# Patient Record
Sex: MALE | Race: WHITE | Employment: FULL TIME | URBAN - METROPOLITAN AREA
[De-identification: names, ages, dates, MRNs, and addresses within clinical notes are randomized per-mention and may not be internally consistent; named-entity substitution may affect disease eponyms.]

---

## 2019-02-13 ENCOUNTER — APPOINTMENT (OUTPATIENT)
Dept: GENERAL RADIOLOGY | Age: 64
End: 2019-02-13
Payer: COMMERCIAL

## 2019-02-13 ENCOUNTER — HOSPITAL ENCOUNTER (EMERGENCY)
Age: 64
Discharge: HOME | End: 2019-02-13
Payer: COMMERCIAL

## 2019-02-13 VITALS
BODY MASS INDEX: 27.59 KG/M2 | TEMPERATURE: 97.6 F | DIASTOLIC BLOOD PRESSURE: 70 MMHG | OXYGEN SATURATION: 97 % | SYSTOLIC BLOOD PRESSURE: 133 MMHG | RESPIRATION RATE: 16 BRPM | HEART RATE: 80 BPM | WEIGHT: 176.15 LBS

## 2019-02-13 DIAGNOSIS — S39.012A STRAIN OF LUMBAR REGION, INITIAL ENCOUNTER: Primary | ICD-10-CM
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
## 2019-02-13 PROCEDURE — 6360000002 HC RX W HCPCS: Performed by: PHYSICIAN ASSISTANT

## 2019-02-13 PROCEDURE — 99283 EMERGENCY DEPT VISIT LOW MDM: CPT

## 2019-02-13 PROCEDURE — 96372 THER/PROPH/DIAG INJ SC/IM: CPT

## 2019-02-13 PROCEDURE — 72100 X-RAY EXAM L-S SPINE 2/3 VWS: CPT

## 2019-02-13 PROCEDURE — 73502 X-RAY EXAM HIP UNI 2-3 VIEWS: CPT

## 2019-02-13 RX ORDER — KETOROLAC TROMETHAMINE 30 MG/ML
60 INJECTION, SOLUTION INTRAMUSCULAR; INTRAVENOUS ONCE
Status: COMPLETED | OUTPATIENT
Start: 2019-02-13 | End: 2019-02-13

## 2019-02-13 RX ORDER — IBUPROFEN 800 MG/1
800 TABLET ORAL
Qty: 21 TABLET | Refills: 0 | Status: SHIPPED | OUTPATIENT
Start: 2019-02-13 | End: 2019-10-03

## 2019-02-13 RX ADMIN — KETOROLAC TROMETHAMINE 60 MG: 30 INJECTION, SOLUTION INTRAMUSCULAR at 17:25

## 2019-02-13 ASSESSMENT — PAIN DESCRIPTION - ORIENTATION: ORIENTATION: LEFT

## 2019-02-13 ASSESSMENT — PAIN SCALES - GENERAL
PAINLEVEL_OUTOF10: 8
PAINLEVEL_OUTOF10: 7

## 2019-02-13 ASSESSMENT — PAIN DESCRIPTION - LOCATION: LOCATION: BACK

## 2019-02-13 ASSESSMENT — PAIN DESCRIPTION - FREQUENCY: FREQUENCY: CONTINUOUS

## 2019-02-13 ASSESSMENT — PAIN DESCRIPTION - PAIN TYPE: TYPE: ACUTE PAIN

## 2019-02-13 ASSESSMENT — PAIN DESCRIPTION - DESCRIPTORS: DESCRIPTORS: ACHING

## 2019-09-17 LAB
T4 FREE: 0.9 NG/DL (ref 0.9–1.8)
TSH SERPL DL<=0.05 MIU/L-ACNC: 4.36 UIU/ML (ref 0.27–4.2)

## 2019-10-02 VITALS
BODY MASS INDEX: 28.62 KG/M2 | RESPIRATION RATE: 18 BRPM | TEMPERATURE: 97.2 F | HEIGHT: 67 IN | WEIGHT: 182.32 LBS | DIASTOLIC BLOOD PRESSURE: 72 MMHG | SYSTOLIC BLOOD PRESSURE: 108 MMHG | HEART RATE: 62 BPM | OXYGEN SATURATION: 97 %

## 2019-10-03 ENCOUNTER — HOSPITAL ENCOUNTER (EMERGENCY)
Age: 64
Discharge: HOME | End: 2019-10-03
Payer: COMMERCIAL

## 2019-10-03 ENCOUNTER — APPOINTMENT (OUTPATIENT)
Dept: GENERAL RADIOLOGY | Age: 64
End: 2019-10-03
Payer: COMMERCIAL
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
## 2019-10-03 DIAGNOSIS — M25.512 ACUTE PAIN OF LEFT SHOULDER: Primary | ICD-10-CM

## 2019-10-03 PROCEDURE — 99283 EMERGENCY DEPT VISIT LOW MDM: CPT

## 2019-10-03 PROCEDURE — 73030 X-RAY EXAM OF SHOULDER: CPT

## 2019-10-03 RX ORDER — IBUPROFEN 800 MG/1
800 TABLET ORAL EVERY 8 HOURS PRN
Qty: 30 TABLET | Refills: 0 | Status: SHIPPED | OUTPATIENT
Start: 2019-10-03 | End: 2021-01-28

## 2019-10-04 ENCOUNTER — OFFICE VISIT (OUTPATIENT)
Dept: ORTHOPEDIC SURGERY | Age: 64
End: 2019-10-04
Payer: COMMERCIAL

## 2019-10-04 VITALS
HEIGHT: 67 IN | RESPIRATION RATE: 16 BRPM | WEIGHT: 182.32 LBS | SYSTOLIC BLOOD PRESSURE: 131 MMHG | DIASTOLIC BLOOD PRESSURE: 84 MMHG | HEART RATE: 60 BPM | BODY MASS INDEX: 28.62 KG/M2

## 2019-10-04 DIAGNOSIS — M75.82 ROTATOR CUFF TENDONITIS, LEFT: Primary | ICD-10-CM

## 2019-10-04 DIAGNOSIS — M19.012 ARTHRITIS OF LEFT ACROMIOCLAVICULAR JOINT: ICD-10-CM

## 2019-10-04 PROCEDURE — 99203 OFFICE O/P NEW LOW 30 MIN: CPT | Performed by: ORTHOPAEDIC SURGERY

## 2019-10-04 RX ORDER — METHYLPREDNISOLONE 4 MG/1
TABLET ORAL
Qty: 1 KIT | Refills: 0 | Status: SHIPPED | OUTPATIENT
Start: 2019-10-04 | End: 2019-10-10

## 2019-10-25 ENCOUNTER — OFFICE VISIT (OUTPATIENT)
Dept: ORTHOPEDIC SURGERY | Age: 64
End: 2019-10-25
Payer: COMMERCIAL

## 2019-10-25 VITALS
BODY MASS INDEX: 29.25 KG/M2 | WEIGHT: 182 LBS | HEART RATE: 62 BPM | DIASTOLIC BLOOD PRESSURE: 60 MMHG | SYSTOLIC BLOOD PRESSURE: 111 MMHG | HEIGHT: 66 IN

## 2019-10-25 DIAGNOSIS — M75.82 ROTATOR CUFF TENDONITIS, LEFT: Primary | ICD-10-CM

## 2019-10-25 DIAGNOSIS — M19.012 ARTHRITIS OF LEFT ACROMIOCLAVICULAR JOINT: ICD-10-CM

## 2019-10-25 PROCEDURE — 99213 OFFICE O/P EST LOW 20 MIN: CPT | Performed by: ORTHOPAEDIC SURGERY

## 2019-10-30 ENCOUNTER — TELEPHONE (OUTPATIENT)
Dept: ORTHOPEDIC SURGERY | Age: 64
End: 2019-10-30

## 2019-10-31 ENCOUNTER — TELEPHONE (OUTPATIENT)
Dept: ORTHOPEDIC SURGERY | Age: 64
End: 2019-10-31

## 2019-11-06 ENCOUNTER — TELEPHONE (OUTPATIENT)
Dept: ORTHOPEDIC SURGERY | Age: 64
End: 2019-11-06

## 2019-11-07 ENCOUNTER — TELEPHONE (OUTPATIENT)
Dept: ORTHOPEDIC SURGERY | Age: 64
End: 2019-11-07

## 2019-12-02 ENCOUNTER — TELEPHONE (OUTPATIENT)
Dept: ORTHOPEDIC SURGERY | Age: 64
End: 2019-12-02

## 2020-03-02 ENCOUNTER — TELEPHONE (OUTPATIENT)
Dept: ORTHOPEDIC SURGERY | Age: 65
End: 2020-03-02

## 2020-03-18 ENCOUNTER — TELEPHONE (OUTPATIENT)
Dept: ORTHOPEDIC SURGERY | Age: 65
End: 2020-03-18

## 2020-03-19 ENCOUNTER — TELEPHONE (OUTPATIENT)
Dept: ORTHOPEDIC SURGERY | Age: 65
End: 2020-03-19

## 2020-03-19 NOTE — TELEPHONE ENCOUNTER
Natali Carias AND [facility] [place] [employer] WOULD LIKE A  CALL BACK ABOUT PATIENT'S WORKERS COMP CLAIM. [employer] FAXED OVER DOCUMENTS BUT DOES NOT KNOW IF THEY WERE RECEIVED.  [facility] 30 [phone]

## 2020-03-19 NOTE — TELEPHONE ENCOUNTER
I called patient and told him that the approved C-9 and the MRI order was faxed to [facility] and I also called  and told him we received the information

## 2020-03-20 ENCOUNTER — TELEPHONE (OUTPATIENT)
Dept: ORTHOPEDIC SURGERY | Age: 65
End: 2020-03-20

## 2020-03-24 ENCOUNTER — OFFICE VISIT (OUTPATIENT)
Dept: ORTHOPEDIC SURGERY | Age: 65
End: 2020-03-24
Payer: COMMERCIAL

## 2020-03-24 VITALS — BODY MASS INDEX: 27.32 KG/M2 | WEIGHT: 170 LBS | TEMPERATURE: 98 F | HEIGHT: 66 IN

## 2020-03-24 PROCEDURE — 99213 OFFICE O/P EST LOW 20 MIN: CPT | Performed by: ORTHOPAEDIC SURGERY

## 2020-03-24 NOTE — PATIENT INSTRUCTIONS
Impression:   1. No obvious new tear of the rotator cuff. 2.  The previous repair of the rotator cuff shows a 2 x 2 centimeter area of scarred tendon representing the repaired rotator cuff. This area is markedly thinned but appears intact. 3.   Moderate supraspinatus muscle atrophy. 4.  Mild infraspinatus muscle atrophy. Plan/Treatment:   1. The knees were explained to Mr. Yazmin Ibanez. 2.  He will proceed with MRI scan as per Dr. Jayden flower. 3.  Return to Dr. Ranjana Ma for follow-up.       Diann Lopez MD  3/24/2020

## 2020-03-24 NOTE — PROGRESS NOTES
to acromion  Scapular Winging: Yes, mild bilateral  Palpation/Tenderness: yes - greater tuberosity and mild AC joint  Crepitus:  Neurovascular Status: intact    Pulses (0-4)   Radial    Ulnar   Right     Left 2+      Range of Motion: Degrees   Abduction External Internal Passive Abd   Right 140 90 45    Left 140 40 30       SpecialTest:   Impingement Jovani Rojas Crossover Sign Speed Sawyer Subacromial  inj SLAP T    DLST     Right    neg      Left        +          Strength Rotator Cuff:           Normal=N    Weak=W     Antalgic=A    Supraspinatus  Subscapularis  Infraspinatus  Teres Minor    Right N N N     Left W, A N W, A        ---------------------------------------------------------------------------------------------------------------------  ----------------------------------------------------------------------------------------------------------------------    [facility] Diagnostic Ultrasound. Diagnostic evaluation of the left Shoulder : The structures of the Left shoulder were visualized using the  Luminal 18/4 linear transducer    · The Acromio Clavicular joint in sagittal view showed moderate arthritic changes but no instability on stress view. · The Long Head of the Biceps was evaluated in both long and short axis. The biceps tendon remained within the bicipital groove with a normal amount of fluid within the bicipital sheath. · The Subscapularis tendon was evaluated in long and short axis appeared intact with no evidence of tear. · The Subdeltoid bursa was evaluated and showed no increase in the subdeltoid fluid. ·  The Supraspinatus tendon was closely evaluated in long and short axis and shows the old rotator cuff tear healed with mature scar tissue measuring approximately 2 cm x 2 cm. This portion of the rotator cuff is proximately 2 mm in thickness. No obvious re-tear of the rotator cuff is noted.   The old hardware is noted at the supraspinatus insertion into the greater tuberosity. ·  The Infraspinatus tendon was evaluated in long and short axis and appears intact. The tendon thickness is normal at approximately 5 mm. ·  The Teres Minor tendon was also evaluated in long and short axis and appears intact. ·  The Posterior Glenohumeral joint including the labrum were evaluated and appears normal.  ·  The Suprascapular and Spinoglenoid notches were evaluated and showed no abnormal fluid collections. ·  The Supraspinatus muscle quality was evaluated and does appear moderately atrophic. ·  The Infraspinatus muscle was also evaluated and shows evidence of mild atrophy.    ---------------------------------------------------------------------------------------------------------------------  ---------------------------------------------------------------------------------------------------------------------    Impression:   1. No obvious new tear of the rotator cuff. 2.  The previous repair of the rotator cuff shows a 2 x 2 centimeter area of scarred tendon representing the repaired rotator cuff. This area is markedly thinned but appears intact. 3.   Moderate supraspinatus muscle atrophy. 4.  Mild infraspinatus muscle atrophy. Plan/Treatment:   1. The findings were explained to Mr. Alpesh Chapin. 2.  He will proceed with MRI scan as per Dr. Mindi Samuel orders. 3.  Return to Dr. Leonard Crowe for follow-up. Quincy Marti MD  3/24/2020    [facility] and may contain mechanical errors related to translation.

## 2020-03-30 ENCOUNTER — TELEPHONE (OUTPATIENT)
Dept: ORTHOPEDIC SURGERY | Age: 65
End: 2020-03-30

## 2020-03-31 ENCOUNTER — OFFICE VISIT (OUTPATIENT)
Dept: ORTHOPEDIC SURGERY | Age: 65
End: 2020-03-31
Payer: COMMERCIAL

## 2020-03-31 VITALS — TEMPERATURE: 97.8 F | BODY MASS INDEX: 27.32 KG/M2 | HEIGHT: 66 IN | WEIGHT: 170 LBS

## 2020-03-31 PROCEDURE — 99214 OFFICE O/P EST MOD 30 MIN: CPT | Performed by: ORTHOPAEDIC SURGERY

## 2020-03-31 RX ORDER — IBUPROFEN 600 MG/1
600 TABLET ORAL 3 TIMES DAILY PRN
Qty: 90 TABLET | Refills: 2 | OUTPATIENT
Start: 2020-03-31 | End: 2021-01-28

## 2020-03-31 NOTE — PROGRESS NOTES
Drew Melendrez  [id number]  March 31, 2020    Chief Complaint   Patient presents with    Follow-up     L shoulder MRI results            History: The patient is a 79-year-old gentleman who is here for follow-up evaluation of his left shoulder. He is right-hand dominant. The patient works for a company called [employer]. He works as a . He also fabricates metal.  The patient reportedly was pulling a heavy door frame at work and he felt a pull in the shoulder. The injury occurred on 10/1/2019. He ultimately was evaluated on 10/3/2019. The patient did have a left shoulder rotator cuff repair back in 2001. He continues to work at [id number]. He has been taking ibuprofen intermittently. He denies any numbness or tingling. This is a Worker's Compensation case. The Medrol Dosepak did not alleviate his symptoms. The patient's  past medical history, medications, allergies,  family history, social history, and review of systems have been reviewed, and dated and are recorded in the chart. Pertinent items are noted in HPI. Review of systems reviewed from Pertinent History Form dated on 10/4/19 and available in the patient's chart under the Media tab. Ht 5' 6\" (1.676 m)   Wt 170 lb (77.1 kg)   BMI 27.44 kg/m²     Physical: The patient presents today in no acute distress. He is alert and oriented to person, place and time. He displays appropriate mood and affect. He is well dressed, nourished and  groomed. He has a normal affect. Examination of the neck reveals no evidence of tenderness. Spurling's sign is negative. Active range of motion of the left shoulder is: 165 degrees abduction, 165 degrees forward flexion, 25 degrees of external rotation and internal rotation to L4. Passive range of motion of the left shoulder is: 170 degrees abduction, 170 degrees forward flexion, 30 degrees of external rotation and internal rotation to L4.  Active and passive range of motion of the opposite shoulder is full. Examination of the left shoulder reveals positive Neer and Vaughn' impingement signs. There is mild subacromial crepitus with range of motion. Drop arm test is negative today. Speed's test is negative. There is no evidence of tenderness over the Bicipital groove. He  does have tenderness over the [facility] joint. Cross body adduction test is positive. He has mild tenderness over the subacromial space. There is no evidence of scapular winging. Lift off sign is negative. There is no evidence of atrophy. External rotation strength is 4/5. External rotation strength on the right side is 4/5. There are no skin lesions, cellulitis, or extreme edema in the upper extremities. Sensation is intact to axillary, median, radial, and ulnar nerves bilaterally. The patient has warm and well-perfused bilateral upper extremities with brisk capillary refill. Radial and ulnar pulses are palpable and 2+ bilaterally. X-rays: MRI of the left shoulder was extensively reviewed. The MRI does reveal the surgical changes of a prior rotator cuff repair. He does have evidence of mild chronic tendinopathy of the supraspinatus and infraspinatus. There is mild fluid signal/tendinopathy within the subscapularis. There is also evidence of mild biceps tendinitis. There is evidence of severe AC joint arthritis and mild glenohumeral arthritis. Left shoulder ultrasound per Dr. Lan Mahoney was extensively reviewed. There was no evidence of tear. The ultrasound essentially confirmed the MRI findings. Impression: 1) Left shoulder Rotator Cuff tendonitis/sprain 2) left shoulder aggravation of preexisting AC joint arthritis    Plan: At this time, we will continue to treat the patient conservatively. He will continue to work light duty for 2 more weeks. He will then return to full duty work in approximately 2 weeks. The patient was given a prescription for ibuprofen 600 mg twice a day with food.   He will follow-up with me in approximately 4 weeks and we will reassess him then. We will go ahead and get an injection approved. We will consider an injection if he has continued pain.

## 2020-04-07 ENCOUNTER — TELEPHONE (OUTPATIENT)
Dept: ORTHOPEDIC SURGERY | Age: 65
End: 2020-04-07

## 2020-04-07 NOTE — TELEPHONE ENCOUNTER
I called patient and left message to call me back. I received a C-9 approving injection to left shoulder with disclaimer because we are requesting for additional Dxs to be added. We are going to wait until the Dxs are fully allowed for him to come in for injection. Plus also due to COVID 19 the schedule is very limited. He also needs to call his  to do the Motion to add the Dxs.

## 2020-04-16 ENCOUNTER — TELEPHONE (OUTPATIENT)
Dept: ORTHOPEDIC SURGERY | Age: 65
End: 2020-04-16

## 2020-04-17 ENCOUNTER — TELEPHONE (OUTPATIENT)
Dept: ORTHOPEDIC SURGERY | Age: 65
End: 2020-04-17

## 2020-04-17 NOTE — TELEPHONE ENCOUNTER
[facility] /  /  [facility]   Caller: Elif PIERRE. Phone: [phone] Extension # [phone]  Returning call to [address] a call back.

## 2020-05-15 ENCOUNTER — OFFICE VISIT (OUTPATIENT)
Dept: ORTHOPEDIC SURGERY | Age: 65
End: 2020-05-15
Payer: COMMERCIAL

## 2020-05-15 VITALS — TEMPERATURE: 98.2 F | HEIGHT: 66 IN | WEIGHT: 170 LBS | BODY MASS INDEX: 27.32 KG/M2

## 2020-05-15 PROCEDURE — 99213 OFFICE O/P EST LOW 20 MIN: CPT | Performed by: ORTHOPAEDIC SURGERY

## 2020-05-15 RX ORDER — METHYLPREDNISOLONE 4 MG/1
TABLET ORAL
Qty: 1 KIT | Refills: 0 | Status: SHIPPED | OUTPATIENT
Start: 2020-05-15 | End: 2020-05-21

## 2021-01-28 ENCOUNTER — APPOINTMENT (OUTPATIENT)
Dept: GENERAL RADIOLOGY | Age: 66
End: 2021-01-28
Payer: COMMERCIAL

## 2021-01-28 ENCOUNTER — HOSPITAL ENCOUNTER (EMERGENCY)
Age: 66
Discharge: HOME | End: 2021-01-28
Attending: EMERGENCY MEDICINE
Payer: COMMERCIAL

## 2021-01-28 VITALS
BODY MASS INDEX: 28.3 KG/M2 | RESPIRATION RATE: 14 BRPM | DIASTOLIC BLOOD PRESSURE: 79 MMHG | HEART RATE: 64 BPM | SYSTOLIC BLOOD PRESSURE: 125 MMHG | WEIGHT: 180.34 LBS | OXYGEN SATURATION: 94 % | TEMPERATURE: 98.2 F | HEIGHT: 67 IN

## 2021-01-28 DIAGNOSIS — M25.512 ACUTE PAIN OF LEFT SHOULDER: Primary | ICD-10-CM

## 2021-01-28 PROCEDURE — 99283 EMERGENCY DEPT VISIT LOW MDM: CPT

## 2021-01-28 PROCEDURE — 73030 X-RAY EXAM OF SHOULDER: CPT

## 2021-01-28 PROCEDURE — 73080 X-RAY EXAM OF ELBOW: CPT

## 2021-01-28 RX ORDER — MELOXICAM 7.5 MG/1
7.5 TABLET ORAL DAILY
Qty: 30 TABLET | Refills: 1 | Status: SHIPPED | OUTPATIENT
Start: 2021-01-28 | End: 2021-05-23

## 2021-01-28 ASSESSMENT — ENCOUNTER SYMPTOMS
COUGH: 0
SORE THROAT: 0
ABDOMINAL PAIN: 0

## 2021-01-28 ASSESSMENT — PAIN DESCRIPTION - ONSET: ONSET: ON-GOING

## 2021-01-28 ASSESSMENT — PAIN - FUNCTIONAL ASSESSMENT: PAIN_FUNCTIONAL_ASSESSMENT: PREVENTS OR INTERFERES SOME ACTIVE ACTIVITIES AND ADLS

## 2021-01-28 ASSESSMENT — PAIN DESCRIPTION - LOCATION
LOCATION: SHOULDER
LOCATION: ARM

## 2021-01-28 ASSESSMENT — PAIN DESCRIPTION - DESCRIPTORS: DESCRIPTORS: ACHING

## 2021-01-28 ASSESSMENT — PAIN DESCRIPTION - PROGRESSION: CLINICAL_PROGRESSION: NOT CHANGED

## 2021-01-28 ASSESSMENT — PAIN DESCRIPTION - PAIN TYPE: TYPE: ACUTE PAIN

## 2021-01-28 NOTE — ED PROVIDER NOTES
[address] [facility]      Pt Name: Talya Li  MRN: [id number]  [place] 1955  Date of evaluation: 1/28/2021  Provider: Jude King MD    CHIEF COMPLAINT       Chief Complaint   Patient presents with    Arm Pain     left arm, moving carts last week, states the pain goes from his shoulder to his elbow, worker's comp     HISTORY OF PRESENT ILLNESS  (Location/Symptom, Timing/Onset,Context/Setting, Quality, Duration, Modifying Factors, Severity). Note limiting factors. Talya Li is a 76 y.o. male who presents to the emergency department secondary to concern for left shoulder pain. It started at work last week when he was moving parts from one table to another table. The parts weighed about 50lb or more. He was lifting the weight in front of himself. Since then the pain got a little bit better but his range of motion has decreased a little bit with pain when he lifts it above his head limiting his movements. He has hurt this shoulder before, last year, and also had surgery on it 10 years ago for rotator cuff injury. His orthopaedic physician is Dr. Anjana Mccollum. Denies any chest pain or trouble breathing. Pain is worse with movements trying to lift over head. Has tried motrin/tylenol at home though did not take any today. He is right handed at baseline. Past medical history noted below, significant for GERD, hyperlipidemia, movement disorder. Quit smoking over 10 years ago. Aside from what is stated above denies any other symptoms or modifying factors. Nursing Notes reviewed. REVIEW OF SYSTEMS  (2-9 systems for level 4, 10 or more for level 5)   Review of Systems   Constitutional: Negative for chills and fever. HENT: Negative for sore throat. Respiratory: Negative for cough. Cardiovascular: Negative for leg swelling. Gastrointestinal: Negative for abdominal pain.        PAST MEDICAL HISTORY     Past Medical History: Diagnosis Date    GERD (gastroesophageal reflux disease)     GERD    Hyperlipidemia     Movement disorder     L Rotator Cuff Repair       SURGICALHISTORY       Past Surgical History:   Procedure Laterality Date    ROTATOR CUFF REPAIR       CURRENT MEDICATIONS       Previous Medications    ASPIRIN 81 MG EC TABLET    Take 1 tablet by mouth daily    ATORVASTATIN (LIPITOR) 40 MG TABLET    Take 1 tablet by mouth nightly    RANITIDINE HCL (ZANTAC PO)    Take by mouth as needed Indications: for reflux      ALLERGIES     Patient has no known allergies. FAMILY HISTORY       Family History   Problem Relation Age of Onset    Cancer Mother     Heart Disease Father     High Cholesterol Father      SOCIAL HISTORY       Social History     Socioeconomic History    Marital status:      Spouse name: None    Number of children: None    Years of education: None    Highest education level: None   Occupational History    None   Social Needs    Financial resource strain: None    Food insecurity     Worry: None     Inability: None    Transportation needs     Medical: None     Non-medical: None   Tobacco Use    Smoking status: Former Smoker     Quit date: 3/3/1981     Years since quittin.9    Smokeless tobacco: Never Used   Substance and Sexual Activity    Alcohol use:  Yes     Alcohol/week: 2.0 standard drinks     Types: 2 Cans of beer per week    Drug use: No    Sexual activity: Yes     Partners: Female   Lifestyle    Physical activity     Days per week: None     Minutes per session: None    Stress: None   Relationships    Social connections     Talks on phone: None     Gets together: None     Attends Synagogue service: None     Active member of club or organization: None     Attends meetings of clubs or organizations: None     Relationship status: None    Intimate partner violence     Fear of current or ex partner: None     Emotionally abused: None     Physically abused: None Forced sexual activity: None   Other Topics Concern    None   Social History Narrative    None     SCREENINGS         PHYSICAL EXAM  (up to 7 for level 4, 8 or more for level 5)   INITIAL VITALS: BP: 125/79, Temp: 98.2 °F (36.8 °C), Pulse: 64, Resp: 14, SpO2: 94 %   Physical Exam  Vitals signs reviewed. Constitutional:       General: He is not in acute distress. Appearance: Normal appearance. He is not ill-appearing. HENT:      Head: Normocephalic and atraumatic. Right Ear: External ear normal.      Left Ear: External ear normal.      Nose: Nose normal.   Eyes:      General: No scleral icterus. Right eye: No discharge. Left eye: No discharge. Extraocular Movements: Extraocular movements intact. Conjunctiva/sclera: Conjunctivae normal.      Pupils: Pupils are equal, round, and reactive to light. Neck:      Musculoskeletal: Normal range of motion. Trachea: No tracheal deviation. Cardiovascular:      Rate and Rhythm: Normal rate. Pulmonary:      Effort: Pulmonary effort is normal. No respiratory distress. Musculoskeletal:         General: Tenderness present. Right shoulder: He exhibits decreased range of motion (difficulty with lifiting right arm obliquiely and abduction above the shoulder. positive empty can test). He exhibits no bony tenderness and normal pulse. Right elbow: Normal.He exhibits normal range of motion, no swelling, no effusion, no deformity and no laceration. Right wrist: Normal.      Right lower leg: No edema. Left lower leg: No edema. Comments: Neurovascularly intact distally. Radial pulses 2+ bilaterally   Skin:     General: Skin is warm and dry. Capillary Refill: Capillary refill takes less than 2 seconds. Neurological:      General: No focal deficit present. Mental Status: He is alert and oriented to person, place, and time.    Psychiatric:         Mood and Affect: Mood normal. X-ray imaging had been ordered in triage. They had returned by the time I saw him on my exam and showed no signs of dislocation or fracture, did show some degenerative changes. He also had x-ray imaging done of the elbow, when I asked him about this he then tells me he also has had some discomfort in the elbow specifically that the pain in his shoulder radiates down toward the elbow. His physical exam of the elbow is unremarkable. Patient has seen Dr. Michaela Lee for issues previously and so we instructed him to follow back up with Dr. Perales Check for his acute shoulder issue. We did offer to get him started with physical therapy which he declined stating he would prefer to see Dr. Michaela Lee first.  In the meantime he was ordered meloxicam and we discussed taking this appropriately along with taking Tylenol. Prior to discharge he expressed understanding importance of follow-up as well as return precautions. FINAL IMPRESSION      1.  Acute pain of left shoulder        DISPOSITION/PLAN   DISPOSITION Decision To Discharge 01/28/2021 10:54:10 AM      PATIENT REFERRED TO:  Gabe Hoffmann MD  [address] [place], [place] [id number] [phone]    Schedule an appointment as soon as possible for a visit   For follow up appointment    Julia Gilliam MD  [address] [facility]  [address] [place] [address]  [phone]    Schedule an appointment as soon as possible for a visit   For follow up appointment      DISCHARGE MEDICATIONS:  New Prescriptions    MELOXICAM (MOBIC) 7.5 MG TABLET    Take 1 tablet by mouth daily            (Please note that portions of this note were completed with a voice recognition program. Efforts were made to edit the dictations but occasionally words are mis-transcribed.)    Maritza Martel MD (electronically signed)  Attending Emergency Physician      Maritza Martel MD  01/28/21 [id number]

## 2021-02-12 ENCOUNTER — OFFICE VISIT (OUTPATIENT)
Dept: ORTHOPEDIC SURGERY | Age: 66
End: 2021-02-12
Payer: COMMERCIAL

## 2021-02-12 VITALS — HEIGHT: 67 IN | BODY MASS INDEX: 28.3 KG/M2 | WEIGHT: 180.34 LBS

## 2021-02-12 DIAGNOSIS — M75.82 TENDONITIS OF LEFT ROTATOR CUFF: ICD-10-CM

## 2021-02-12 DIAGNOSIS — S53.402A ELBOW SPRAIN, LEFT, INITIAL ENCOUNTER: ICD-10-CM

## 2021-02-12 DIAGNOSIS — M19.012 ARTHRITIS OF LEFT ACROMIOCLAVICULAR JOINT: ICD-10-CM

## 2021-02-12 DIAGNOSIS — S43.402A SPRAIN OF LEFT SHOULDER, UNSPECIFIED SHOULDER SPRAIN TYPE, INITIAL ENCOUNTER: Primary | ICD-10-CM

## 2021-02-12 PROCEDURE — 99243 OFF/OP CNSLTJ NEW/EST LOW 30: CPT | Performed by: ORTHOPAEDIC SURGERY

## 2021-02-12 RX ORDER — METHYLPREDNISOLONE 4 MG/1
TABLET ORAL
Qty: 1 KIT | Refills: 0 | Status: SHIPPED | OUTPATIENT
Start: 2021-02-12 | End: 2021-02-18

## 2021-02-12 NOTE — PROGRESS NOTES
Verónica Morrissey  [id number]  February 12, 2021    Chief Complaint   Patient presents with    Shoulder Pain     OP. NP. L shoulder and L elbow            History: The patient is a 59-year-old gentleman who is here for evaluation of his left shoulder. 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 He is right-hand dominant. This is a Worker's Compensation case. He was seen back in 2019 and 2020 for a claim for this left shoulder as well. He ultimately recovered and got back to his baseline. The patient does have a history of a left shoulder rotator cuff repair back in 2001. He reportedly was moving a stack of parts onto a table and he felt a pop and had pain in his left shoulder extending into his left elbow. Since that time he has had pain with overhead activities. He did present to the emergency room and x-rays were obtained of his left shoulder and his left elbow. He has been working light duty since this injury. This is a consult from Juan Luis Sierra MD for left shoulder and elbow pain. The patient's  past medical history, medications, allergies,  family history, social history, and review of systems have been reviewed, and dated and are recorded in the chart. Pertinent items are noted in HPI. Review of systems reviewed from Pertinent History Form dated on 2/12/2021 and available in the patient's chart under the Media tab. Ht 5' 7\" (1.702 m)   Wt 180 lb 5.4 oz (81.8 kg)   BMI 28.24 kg/m²     Physical: The patient presents today in no acute distress. He is alert and oriented to person, place and time. He displays appropriate mood and affect. He is well dressed, nourished and  groomed. He has a normal affect. Examination of the neck reveals no evidence of tenderness. Spurling's sign is negative. Active range of motion of the left shoulder is: 170 degrees abduction, 170 degrees forward flexion, 40 degrees of external rotation and internal rotation to L5. Passive range of motion of the left shoulder is: 175 degrees abduction, 175 degrees forward flexion, 40 degrees of external rotation and internal rotation to L4. Active and passive range of motion of the opposite shoulder is full. Examination of the left shoulder reveals positive Neer and Vaughn' impingement signs. There is mild subacromial crepitus with range of motion. Drop arm test is negative bilaterally. Speed's test is negative. There is no evidence of tenderness over the Bicipital groove. He  does have tenderness over the [facility] joint. Cross body adduction test is positive. He has moderate tenderness over the subacromial space. There is no evidence of scapular winging. Lift off sign is negative. There is no evidence of atrophy. External rotation strength is full. Examination of the left elbow reveals mild tenderness to palpation over the lateral epicondyle. He does have a 15 degree flexion contracture of both elbows. He has full pronation and supination. There are no skin lesions, cellulitis, or extreme edema in the upper extremities. Sensation is intact to axillary, median, radial, and ulnar nerves bilaterally. The patient has warm and well-perfused bilateral upper extremities with brisk capillary refill. Radial and ulnar pulses are palpable and 2+ bilaterally. X-rays: The patient did have an MRI of his left shoulder back in March 2020. We again reviewed that MRI and MRI reveals the surgical changes of prior rotator cuff repair. There was chronic tendinopathy of the supraspinatus and infraspinatus. There was mild biceps tendinitis. There was evidence of severe AC joint arthritis. Impression: 1) left shoulder sprain/ left shoulder Rotator Cuff tendonitis/Impingement 2) left shoulder AC joint arthritis #3 left elbow sprain    Plan: At this time, we will treat the patient conservatively. He was given a Medrol Dosepak. He is encouraged to modify his activities.   He was instructed to limit his overhead lifting, pushing and pulling. We will go ahead and get an injection approved. We will keep him at light duty for the next 2 weeks. The patient will follow up with me in approximately 3 to 4 weeks and we will reassess him then.

## 2021-03-17 ENCOUNTER — OFFICE VISIT (OUTPATIENT)
Dept: ORTHOPEDIC SURGERY | Age: 66
End: 2021-03-17
Payer: COMMERCIAL

## 2021-03-17 ENCOUNTER — TELEPHONE (OUTPATIENT)
Dept: ORTHOPEDIC SURGERY | Age: 66
End: 2021-03-17

## 2021-03-17 VITALS — BODY MASS INDEX: 28.3 KG/M2 | HEIGHT: 67 IN | WEIGHT: 180.34 LBS

## 2021-03-17 DIAGNOSIS — S43.402A SPRAIN OF LEFT SHOULDER, UNSPECIFIED SHOULDER SPRAIN TYPE, INITIAL ENCOUNTER: Primary | ICD-10-CM

## 2021-03-17 DIAGNOSIS — M75.82 TENDONITIS OF LEFT ROTATOR CUFF: ICD-10-CM

## 2021-03-17 DIAGNOSIS — S53.402A ELBOW SPRAIN, LEFT, INITIAL ENCOUNTER: ICD-10-CM

## 2021-03-17 DIAGNOSIS — M19.012 ARTHRITIS OF LEFT ACROMIOCLAVICULAR JOINT: ICD-10-CM

## 2021-03-17 PROCEDURE — 99213 OFFICE O/P EST LOW 20 MIN: CPT | Performed by: ORTHOPAEDIC SURGERY

## 2021-03-17 RX ORDER — IBUPROFEN 600 MG/1
600 TABLET ORAL 2 TIMES DAILY WITH MEALS
Qty: 60 TABLET | Refills: 1 | Status: SHIPPED | OUTPATIENT
Start: 2021-03-17

## 2021-03-17 NOTE — TELEPHONE ENCOUNTER
The patient was seen by Dr. Vikas Cordova today. He would like to get a left shoulder cortisone injection approved (, [id number]).

## 2021-03-17 NOTE — PROGRESS NOTES
Mark Najera  [id number]  March 17, 2021    Chief Complaint   Patient presents with    Follow-up     L shoulder            History: The patient is a 62-year-old gentleman who is here for evaluation of his left shoulder. 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 He is right-hand dominant. This is a Worker's Compensation case. The Medrol Dosepak did help his symptoms. He continues to have moderate left shoulder pain with activities. He does have some difficulty and pain with overhead activities. He did return to full duty work. The patient does have a history of a left shoulder rotator cuff repair back in 2001. He reportedly was moving a stack of parts onto a table and he felt a pop and had pain in his left shoulder extending into his left elbow. The patient's  past medical history, medications, allergies,  family history, social history, and review of systems have been reviewed, and dated and are recorded in the chart. Pertinent items are noted in HPI. Review of systems reviewed from Pertinent History Form dated on 2/12/2021 and available in the patient's chart under the Media tab. Ht 5' 7\" (1.702 m)   Wt 180 lb 5.4 oz (81.8 kg)   BMI 28.24 kg/m²     Physical: The patient presents today in no acute distress. He is alert and oriented to person, place and time. He displays appropriate mood and affect. He is well dressed, nourished and  groomed. He has a normal affect. Examination of the neck reveals no evidence of tenderness. Spurling's sign is negative. Active range of motion of the left shoulder is: 170 degrees abduction, 175 degrees forward flexion, 40 degrees of external rotation and internal rotation to L5. Passive range of motion of the left shoulder is: 175 degrees abduction, 175 degrees forward flexion, 40 degrees of external rotation and internal rotation to L4. Active and passive range of motion of the opposite shoulder is full.  Examination of the left shoulder reveals positive Neer and Vaughn' impingement signs. There is mild subacromial crepitus with range of motion. Drop arm test is negative bilaterally. Speed's test is negative. There is no evidence of tenderness over the Bicipital groove. He  does have tenderness over the [facility] joint. Cross body adduction test is positive. He has moderate tenderness over the subacromial space. There is no evidence of scapular winging. Lift off sign is negative. There is no evidence of atrophy. External rotation strength is 4+/5. The patient is no longer tender about the left elbow. He does have a 15 degree flexion contracture of both elbows. He has full pronation and supination. There are no skin lesions, cellulitis, or extreme edema in the upper extremities. Sensation is intact to axillary, median, radial, and ulnar nerves bilaterally. The patient has warm and well-perfused bilateral upper extremities with brisk capillary refill. Radial and ulnar pulses are palpable and 2+ bilaterally. X-rays: The patient did have an MRI of his left shoulder back in March 2020. We again reviewed that MRI and MRI reveals the surgical changes of prior rotator cuff repair. There was chronic tendinopathy of the supraspinatus and infraspinatus. There was mild biceps tendinitis. There was evidence of severe AC joint arthritis. Impression: 1) left shoulder sprain/ left shoulder Rotator Cuff tendonitis/Impingement 2) left shoulder AC joint arthritis #3 left elbow sprain    Plan: At this time, we will treat the patient conservatively. We will go ahead and get a injection approved. He was given a prescription for ibuprofen 600 mg twice a day with food. He was encouraged to modify his activities. He may continue working his regular duty job. The patient will follow up with me in approximately 3 weeks and we will reassess him then. He was instructed to limit his overhead lifting, pushing and pulling.

## 2021-03-19 NOTE — TELEPHONE ENCOUNTER
IW's claim is in Hearing status per the [address] [place] [address] website. I'm unable to submit for the injection as It would be Denied because there our no claim allowances.     Will monitor this claim to see when it either becomes allowed or disallowed

## 2021-04-09 ENCOUNTER — APPOINTMENT (OUTPATIENT)
Dept: CT IMAGING | Age: 66
End: 2021-04-09
Payer: COMMERCIAL

## 2021-04-09 ENCOUNTER — HOSPITAL ENCOUNTER (EMERGENCY)
Age: 66
Discharge: HOME | End: 2021-04-09
Payer: COMMERCIAL

## 2021-04-09 ENCOUNTER — APPOINTMENT (OUTPATIENT)
Dept: GENERAL RADIOLOGY | Age: 66
End: 2021-04-09
Payer: COMMERCIAL

## 2021-04-09 VITALS
WEIGHT: 170 LBS | HEIGHT: 66 IN | TEMPERATURE: 98 F | HEART RATE: 80 BPM | DIASTOLIC BLOOD PRESSURE: 84 MMHG | OXYGEN SATURATION: 96 % | SYSTOLIC BLOOD PRESSURE: 148 MMHG | BODY MASS INDEX: 27.32 KG/M2 | RESPIRATION RATE: 14 BRPM

## 2021-04-09 DIAGNOSIS — M25.512 ACUTE PAIN OF LEFT SHOULDER: ICD-10-CM
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
## 2021-04-09 DIAGNOSIS — R03.0 ELEVATED BLOOD PRESSURE READING: ICD-10-CM

## 2021-04-09 DIAGNOSIS — S16.1XXA STRAIN OF NECK MUSCLE, INITIAL ENCOUNTER: ICD-10-CM

## 2021-04-09 DIAGNOSIS — S09.90XA INJURY OF HEAD, INITIAL ENCOUNTER: Primary | ICD-10-CM

## 2021-04-09 PROCEDURE — 72125 CT NECK SPINE W/O DYE: CPT

## 2021-04-09 PROCEDURE — 99283 EMERGENCY DEPT VISIT LOW MDM: CPT

## 2021-04-09 PROCEDURE — 70450 CT HEAD/BRAIN W/O DYE: CPT

## 2021-04-09 PROCEDURE — 73030 X-RAY EXAM OF SHOULDER: CPT

## 2021-04-09 PROCEDURE — 6370000000 HC RX 637 (ALT 250 FOR IP): Performed by: PHYSICIAN ASSISTANT

## 2021-04-09 RX ORDER — ACETAMINOPHEN 500 MG
1000 TABLET ORAL ONCE
Status: COMPLETED | OUTPATIENT
Start: 2021-04-09 | End: 2021-04-09

## 2021-04-09 RX ADMIN — ACETAMINOPHEN 1000 MG: 500 TABLET ORAL at 12:12

## 2021-04-09 ASSESSMENT — PAIN SCALES - GENERAL: PAINLEVEL_OUTOF10: 8

## 2021-04-09 ASSESSMENT — PAIN DESCRIPTION - ORIENTATION: ORIENTATION: LEFT

## 2021-04-09 NOTE — ED PROVIDER NOTES
HISTORY         Diagnosis Date    GERD (gastroesophageal reflux disease)     GERD    Hyperlipidemia     Movement disorder     L Rotator Cuff Repair       SURGICAL HISTORY         Procedure Laterality Date    ROTATOR CUFF REPAIR         CURRENT MEDICATIONS       Discharge Medication List as of 4/9/2021  1:41 PM      CONTINUE these medications which have NOT CHANGED    Details   ibuprofen (ADVIL;MOTRIN) 600 MG tablet Take 1 tablet by mouth 2 times daily (with meals), Disp-60 tablet, R-1Normal      meloxicam (MOBIC) 7.5 MG tablet Take 1 tablet by mouth daily, Disp-30 tablet, R-1Normal      aspirin 81 MG EC tablet Take 1 tablet by mouth daily, Disp-30 tablet, R-3Print      atorvastatin (LIPITOR) 40 MG tablet Take 1 tablet by mouth nightly, Disp-30 tablet, R-3Print      Ranitidine HCl (ZANTAC PO) Take by mouth as needed Indications: for refluxHistorical Med             ALLERGIES     Patient has no known allergies. FAMILY HISTORY           Problem Relation Age of Onset    Cancer Mother     Heart Disease Father     High Cholesterol Father      Family Status   Relation Name Status    Mother  (Not Specified)    Father  (Not Specified)        SOCIAL HISTORY      reports that he quit smoking about 40 years ago. He has never used smokeless tobacco. He reports current alcohol use of about 2.0 standard drinks of alcohol per week. He reports that he does not use drugs. PHYSICAL EXAM    (up to 7 for level 4, 8 or more for level 5)     ED Triage Vitals [04/09/21 1108]   BP Temp Temp Source Pulse Resp SpO2 Height Weight   (!) 148/84 98 °F (36.7 °C) Tympanic 80 14 96 % 5' 6\" (1.676 m) 170 lb (77.1 kg)       Physical Exam  Constitutional:       General: He is not in acute distress. Appearance: Normal appearance. He is not ill-appearing, toxic-appearing or diaphoretic. HENT:      Head: Normocephalic and atraumatic.       Comments: No racoon eyes or coffey signs bilaterally     Nose: Nose normal.      Comments: No septal hematoma bilaterally  Eyes:      Conjunctiva/sclera: Conjunctivae normal.      Pupils: Pupils are equal, round, and reactive to light. Neck:      Musculoskeletal: Normal range of motion and neck supple. Cardiovascular:      Rate and Rhythm: Normal rate and regular rhythm. Heart sounds: Normal heart sounds. Pulmonary:      Effort: Pulmonary effort is normal. No respiratory distress. Breath sounds: Normal breath sounds. Musculoskeletal:      Comments: Minimal TTP cervical midline. No TTP thoracic or lumbar midline    No TTP of the left shoulder with no erythema edema or ecchymosis. Has some superficial abrasion overlying AC joint area but are nontender with no surrounding erythema or edema. No TTP upper arm. Elbow nontender. Forearm nontender. Radial pulse 2+. Compartments of the arm are soft. ROM of shoulder mildly decreased   Skin:     General: Skin is warm. Neurological:      Mental Status: He is alert. Psychiatric:         Mood and Affect: Mood normal.         Behavior: Behavior normal.         Thought Content: Thought content normal.         Judgment: Judgment normal.         DIFFERENTIAL DIAGNOSIS   Fracture, dislocation, soft tissue injury  Subdural hematoma, Epidural Hematoma, Subarachnoid Hemorrhage, Traumatic Brain Injury, Cervical Spine fracture      DIAGNOSTICRESULTS         RADIOLOGY:   Non-plain film images such as CT, Ultrasound and MRI are read by the radiologist. Plain radiographic images are visualized and preliminarily interpreted by MONTRELL Mayes with the below findings:      Interpretation per the Radiologist below, if available at the time of this note:    XR SHOULDER LEFT (MIN 2 VIEWS)   Final Result   Negative for acute fracture         CT HEAD WO CONTRAST   Final Result   1. No acute traumatic intracranial abnormality. 2. No traumatic abnormality of the cervical spine. CT CERVICAL SPINE WO CONTRAST   Final Result   1.  No acute traumatic intracranial abnormality. 2. No traumatic abnormality of the cervical spine. LABS:  No results found for this visit on 04/09/21. All other labs were within normal range or not returned as of this dictation. EMERGENCY DEPARTMENT COURSE and DIFFERENTIALDIAGNOSIS/MDM:   Vitals:    Vitals:    04/09/21 1108   BP: (!) 148/84   Pulse: 80   Resp: 14   Temp: 98 °F (36.7 °C)   TempSrc: Tympanic   SpO2: 96%   Weight: 170 lb (77.1 kg)   Height: 5' 6\" (1.676 m)       Patient wasnontoxic, well appearing, afebrile with normal vital signs with the exception of HTN. Saturating well on room air. Fall was mechanical in nature with no prodromal symptoms. Will obtain cT head and also CT cervical spine as he does have some neck pain on exam.  Both were negative. Xray left shoulder negative. Instructed patient to follow up with [address] [place] [address] in next few days for reeval and to return for worsening. He agreed and understood    I estimate there is LOW risk for SKULL FRACTURE, SUBARACHNOID HEMORRHAGE, INTRACRANIAL HEMORRHAGE, SUBDURAL OR EPIDURAL HEMATOMA,  thus I consider the discharge disposition reasonable. Nikhil Rutledge PROCEDURES:  None    FINAL IMPRESSION      1. Injury of head, initial encounter    2. Fall on same level from tripping    3. Strain of neck muscle, initial encounter    4. Acute pain of left shoulder    5.  Elevated blood pressure reading        DISPOSITION/PLAN   DISPOSITION Decision To Discharge 04/09/2021 01:36:18 PM      PATIENT REFERRED TO:  *[address] [place] [address] [address] [phone]    Schedule an appointment as soon as possible for a visit in 3 days  for reevaluation    [facility] Emergency Department  [address] [facility]  [phone]    As needed, If symptoms worsen      DISCHARGE MEDICATIONS:  Discharge Medication List as of 4/9/2021  1:41 PM          (Please note that portions ofthis note were completed with a voice recognition program.  Efforts were made to edit the dictations but occasionally words are mis-transcribed.)    Ranjith Reich, [address] [place] [address]  04/10/21 9298

## 2021-04-10 ASSESSMENT — ENCOUNTER SYMPTOMS
ABDOMINAL PAIN: 0
NAUSEA: 0
VOMITING: 0
SHORTNESS OF BREATH: 0

## 2021-05-23 ENCOUNTER — HOSPITAL ENCOUNTER (INPATIENT)
Age: 66
LOS: 1 days | Discharge: HOME | DRG: 202 | End: 2021-05-24
Attending: EMERGENCY MEDICINE | Admitting: HOSPITALIST
Payer: COMMERCIAL

## 2021-05-23 ENCOUNTER — APPOINTMENT (OUTPATIENT)
Dept: GENERAL RADIOLOGY | Age: 66
DRG: 202 | End: 2021-05-23
Payer: COMMERCIAL

## 2021-05-23 ENCOUNTER — APPOINTMENT (OUTPATIENT)
Dept: CT IMAGING | Age: 66
DRG: 202 | End: 2021-05-23
Payer: COMMERCIAL

## 2021-05-23 DIAGNOSIS — D72.829 LEUKOCYTOSIS, UNSPECIFIED TYPE: ICD-10-CM

## 2021-05-23 DIAGNOSIS — J45.909 REACTIVE AIRWAY DISEASE WITHOUT COMPLICATION, UNSPECIFIED ASTHMA SEVERITY, UNSPECIFIED WHETHER PERSISTENT: ICD-10-CM

## 2021-05-23 DIAGNOSIS — J98.01 BRONCHOSPASM: ICD-10-CM

## 2021-05-23 DIAGNOSIS — J96.01 ACUTE RESPIRATORY FAILURE WITH HYPOXIA (HCC): Primary | ICD-10-CM

## 2021-05-23 DIAGNOSIS — R06.82 TACHYPNEA: ICD-10-CM

## 2021-05-23 PROBLEM — R06.02 SOB (SHORTNESS OF BREATH): Status: ACTIVE | Noted: 2021-05-23

## 2021-05-23 LAB
ANION GAP SERPL CALCULATED.3IONS-SCNC: 14 MMOL/L (ref 3–16)
BASE EXCESS VENOUS: 0.6 MMOL/L
BASOPHILS ABSOLUTE: 0 K/UL (ref 0–0.2)
BASOPHILS RELATIVE PERCENT: 0.1 %
BUN BLDV-MCNC: 7 MG/DL (ref 7–20)
CALCIUM SERPL-MCNC: 8.8 MG/DL (ref 8.3–10.6)
CARBOXYHEMOGLOBIN: <1 %
CHLORIDE BLD-SCNC: 104 MMOL/L (ref 99–110)
CO2: 23 MMOL/L (ref 21–32)
CREAT SERPL-MCNC: 0.8 MG/DL (ref 0.8–1.3)
EOSINOPHILS ABSOLUTE: 0.1 K/UL (ref 0–0.6)
EOSINOPHILS RELATIVE PERCENT: 0.6 %
GFR AFRICAN AMERICAN: >60
GFR NON-AFRICAN AMERICAN: >60
GLUCOSE BLD-MCNC: 113 MG/DL (ref 70–99)
HCO3 VENOUS: 27 MMOL/L (ref 23–29)
HCT VFR BLD CALC: 43.6 % (ref 40.5–52.5)
HEMOGLOBIN: 14.7 G/DL (ref 13.5–17.5)
LYMPHOCYTES ABSOLUTE: 1.1 K/UL (ref 1–5.1)
LYMPHOCYTES RELATIVE PERCENT: 7.6 %
MCH RBC QN AUTO: 29.5 PG (ref 26–34)
MCHC RBC AUTO-ENTMCNC: 33.7 G/DL (ref 31–36)
MCV RBC AUTO: 87.7 FL (ref 80–100)
METHEMOGLOBIN VENOUS: 0.4 %
MONOCYTES ABSOLUTE: 0.6 K/UL (ref 0–1.3)
MONOCYTES RELATIVE PERCENT: 4.3 %
NEUTROPHILS ABSOLUTE: 13 K/UL (ref 1.7–7.7)
NEUTROPHILS RELATIVE PERCENT: 87.4 %
O2 SAT, VEN: 70 %
O2 THERAPY: NORMAL
PCO2, VEN: 47.8 MMHG (ref 40–50)
PDW BLD-RTO: 14 % (ref 12.4–15.4)
PH VENOUS: 7.36 (ref 7.35–7.45)
PLATELET # BLD: 225 K/UL (ref 135–450)
PMV BLD AUTO: 7.7 FL (ref 5–10.5)
PO2, VEN: 39 MMHG
POTASSIUM REFLEX MAGNESIUM: 4.1 MMOL/L (ref 3.5–5.1)
PRO-BNP: 23 PG/ML (ref 0–124)
RBC # BLD: 4.97 M/UL (ref 4.2–5.9)
SARS-COV-2, NAAT: NOT DETECTED
SODIUM BLD-SCNC: 141 MMOL/L (ref 136–145)
TCO2 CALC VENOUS: 28 MMOL/L
TROPONIN: <0.01 NG/ML
WBC # BLD: 14.9 K/UL (ref 4–11)

## 2021-05-23 PROCEDURE — 87635 SARS-COV-2 COVID-19 AMP PRB: CPT

## 2021-05-23 PROCEDURE — 6360000004 HC RX CONTRAST MEDICATION: Performed by: PHYSICIAN ASSISTANT

## 2021-05-23 PROCEDURE — 94761 N-INVAS EAR/PLS OXIMETRY MLT: CPT

## 2021-05-23 PROCEDURE — 6360000002 HC RX W HCPCS: Performed by: HOSPITALIST

## 2021-05-23 PROCEDURE — 2580000003 HC RX 258: Performed by: HOSPITALIST

## 2021-05-23 PROCEDURE — 71046 X-RAY EXAM CHEST 2 VIEWS: CPT

## 2021-05-23 PROCEDURE — 84484 ASSAY OF TROPONIN QUANT: CPT

## 2021-05-23 PROCEDURE — 85025 COMPLETE CBC W/AUTO DIFF WBC: CPT

## 2021-05-23 PROCEDURE — 1200000000 HC SEMI PRIVATE

## 2021-05-23 PROCEDURE — 99283 EMERGENCY DEPT VISIT LOW MDM: CPT

## 2021-05-23 PROCEDURE — 83880 ASSAY OF NATRIURETIC PEPTIDE: CPT

## 2021-05-23 PROCEDURE — 93005 ELECTROCARDIOGRAM TRACING: CPT | Performed by: EMERGENCY MEDICINE

## 2021-05-23 PROCEDURE — 71260 CT THORAX DX C+: CPT

## 2021-05-23 PROCEDURE — 6370000000 HC RX 637 (ALT 250 FOR IP): Performed by: HOSPITALIST

## 2021-05-23 PROCEDURE — 6370000000 HC RX 637 (ALT 250 FOR IP): Performed by: PHYSICIAN ASSISTANT

## 2021-05-23 PROCEDURE — 93005 ELECTROCARDIOGRAM TRACING: CPT | Performed by: PHYSICIAN ASSISTANT

## 2021-05-23 PROCEDURE — 2700000000 HC OXYGEN THERAPY PER DAY

## 2021-05-23 PROCEDURE — 36415 COLL VENOUS BLD VENIPUNCTURE: CPT

## 2021-05-23 PROCEDURE — 94640 AIRWAY INHALATION TREATMENT: CPT

## 2021-05-23 PROCEDURE — 82803 BLOOD GASES ANY COMBINATION: CPT

## 2021-05-23 PROCEDURE — 6370000000 HC RX 637 (ALT 250 FOR IP): Performed by: EMERGENCY MEDICINE

## 2021-05-23 PROCEDURE — 80048 BASIC METABOLIC PNL TOTAL CA: CPT

## 2021-05-23 RX ORDER — IPRATROPIUM BROMIDE AND ALBUTEROL SULFATE 2.5; .5 MG/3ML; MG/3ML
2 SOLUTION RESPIRATORY (INHALATION) ONCE
Status: COMPLETED | OUTPATIENT
Start: 2021-05-23 | End: 2021-05-23

## 2021-05-23 RX ORDER — PREDNISONE 20 MG/1
40 TABLET ORAL DAILY
Status: DISCONTINUED | OUTPATIENT
Start: 2021-05-26 | End: 2021-05-24 | Stop reason: HOSPADM

## 2021-05-23 RX ORDER — ACETAMINOPHEN 650 MG/1
650 SUPPOSITORY RECTAL EVERY 6 HOURS PRN
Status: DISCONTINUED | OUTPATIENT
Start: 2021-05-23 | End: 2021-05-24 | Stop reason: HOSPADM

## 2021-05-23 RX ORDER — ACETAMINOPHEN 325 MG/1
650 TABLET ORAL EVERY 6 HOURS PRN
Status: DISCONTINUED | OUTPATIENT
Start: 2021-05-23 | End: 2021-05-24 | Stop reason: HOSPADM

## 2021-05-23 RX ORDER — ATORVASTATIN CALCIUM 40 MG/1
40 TABLET, FILM COATED ORAL NIGHTLY
Status: DISCONTINUED | OUTPATIENT
Start: 2021-05-23 | End: 2021-05-24 | Stop reason: HOSPADM

## 2021-05-23 RX ORDER — PROMETHAZINE HYDROCHLORIDE 25 MG/1
12.5 TABLET ORAL EVERY 6 HOURS PRN
Status: DISCONTINUED | OUTPATIENT
Start: 2021-05-23 | End: 2021-05-24 | Stop reason: HOSPADM

## 2021-05-23 RX ORDER — PREDNISONE 20 MG/1
60 TABLET ORAL ONCE
Status: COMPLETED | OUTPATIENT
Start: 2021-05-23 | End: 2021-05-23

## 2021-05-23 RX ORDER — METHYLPREDNISOLONE SODIUM SUCCINATE 40 MG/ML
40 INJECTION, POWDER, LYOPHILIZED, FOR SOLUTION INTRAMUSCULAR; INTRAVENOUS EVERY 6 HOURS
Status: DISCONTINUED | OUTPATIENT
Start: 2021-05-23 | End: 2021-05-24 | Stop reason: HOSPADM

## 2021-05-23 RX ORDER — ASPIRIN 81 MG/1
81 TABLET ORAL DAILY
Status: DISCONTINUED | OUTPATIENT
Start: 2021-05-24 | End: 2021-05-24 | Stop reason: HOSPADM

## 2021-05-23 RX ORDER — SODIUM CHLORIDE 0.9 % (FLUSH) 0.9 %
5-40 SYRINGE (ML) INJECTION EVERY 12 HOURS SCHEDULED
Status: DISCONTINUED | OUTPATIENT
Start: 2021-05-23 | End: 2021-05-24 | Stop reason: HOSPADM

## 2021-05-23 RX ORDER — RANITIDINE 150 MG/1
150 TABLET ORAL 2 TIMES DAILY
Status: DISCONTINUED | OUTPATIENT
Start: 2021-05-23 | End: 2021-05-23 | Stop reason: CLARIF

## 2021-05-23 RX ORDER — SODIUM CHLORIDE 0.9 % (FLUSH) 0.9 %
5-40 SYRINGE (ML) INJECTION PRN
Status: DISCONTINUED | OUTPATIENT
Start: 2021-05-23 | End: 2021-05-24 | Stop reason: HOSPADM

## 2021-05-23 RX ORDER — ONDANSETRON 2 MG/ML
4 INJECTION INTRAMUSCULAR; INTRAVENOUS EVERY 6 HOURS PRN
Status: DISCONTINUED | OUTPATIENT
Start: 2021-05-23 | End: 2021-05-24 | Stop reason: HOSPADM

## 2021-05-23 RX ORDER — POLYETHYLENE GLYCOL 3350 17 G/17G
17 POWDER, FOR SOLUTION ORAL DAILY PRN
Status: DISCONTINUED | OUTPATIENT
Start: 2021-05-23 | End: 2021-05-24 | Stop reason: HOSPADM

## 2021-05-23 RX ORDER — ACETAMINOPHEN 500 MG
1000 TABLET ORAL ONCE
Status: COMPLETED | OUTPATIENT
Start: 2021-05-23 | End: 2021-05-23

## 2021-05-23 RX ORDER — SODIUM CHLORIDE 9 MG/ML
INJECTION, SOLUTION INTRAVENOUS CONTINUOUS
Status: DISCONTINUED | OUTPATIENT
Start: 2021-05-23 | End: 2021-05-24 | Stop reason: HOSPADM

## 2021-05-23 RX ORDER — IPRATROPIUM BROMIDE AND ALBUTEROL SULFATE 2.5; .5 MG/3ML; MG/3ML
1 SOLUTION RESPIRATORY (INHALATION)
Status: DISCONTINUED | OUTPATIENT
Start: 2021-05-23 | End: 2021-05-24 | Stop reason: HOSPADM

## 2021-05-23 RX ORDER — IPRATROPIUM BROMIDE AND ALBUTEROL SULFATE 2.5; .5 MG/3ML; MG/3ML
1 SOLUTION RESPIRATORY (INHALATION) ONCE
Status: COMPLETED | OUTPATIENT
Start: 2021-05-23 | End: 2021-05-23

## 2021-05-23 RX ORDER — FAMOTIDINE 20 MG/1
20 TABLET, FILM COATED ORAL 2 TIMES DAILY
Status: DISCONTINUED | OUTPATIENT
Start: 2021-05-23 | End: 2021-05-24 | Stop reason: HOSPADM

## 2021-05-23 RX ORDER — SODIUM CHLORIDE 9 MG/ML
25 INJECTION, SOLUTION INTRAVENOUS PRN
Status: DISCONTINUED | OUTPATIENT
Start: 2021-05-23 | End: 2021-05-24 | Stop reason: HOSPADM

## 2021-05-23 RX ADMIN — IOPAMIDOL 75 ML: 755 INJECTION, SOLUTION INTRAVENOUS at 14:26

## 2021-05-23 RX ADMIN — AZITHROMYCIN MONOHYDRATE 500 MG: 500 INJECTION, POWDER, LYOPHILIZED, FOR SOLUTION INTRAVENOUS at 18:20

## 2021-05-23 RX ADMIN — ACETAMINOPHEN 1000 MG: 500 TABLET ORAL at 15:42

## 2021-05-23 RX ADMIN — PREDNISONE 60 MG: 20 TABLET ORAL at 15:43

## 2021-05-23 RX ADMIN — SODIUM CHLORIDE: 9 INJECTION, SOLUTION INTRAVENOUS at 18:20

## 2021-05-23 RX ADMIN — ATORVASTATIN CALCIUM 40 MG: 40 TABLET, FILM COATED ORAL at 20:23

## 2021-05-23 RX ADMIN — METHYLPREDNISOLONE SODIUM SUCCINATE 40 MG: 40 INJECTION, POWDER, FOR SOLUTION INTRAMUSCULAR; INTRAVENOUS at 18:20

## 2021-05-23 RX ADMIN — IPRATROPIUM BROMIDE AND ALBUTEROL SULFATE 2 AMPULE: .5; 3 SOLUTION RESPIRATORY (INHALATION) at 14:49

## 2021-05-23 RX ADMIN — IPRATROPIUM BROMIDE AND ALBUTEROL SULFATE 1 AMPULE: .5; 3 SOLUTION RESPIRATORY (INHALATION) at 20:01

## 2021-05-23 RX ADMIN — FAMOTIDINE 20 MG: 20 TABLET, FILM COATED ORAL at 20:23

## 2021-05-23 RX ADMIN — IPRATROPIUM BROMIDE AND ALBUTEROL SULFATE 1 AMPULE: .5; 3 SOLUTION RESPIRATORY (INHALATION) at 15:40

## 2021-05-23 ASSESSMENT — ENCOUNTER SYMPTOMS
SHORTNESS OF BREATH: 1
COUGH: 1
VOMITING: 0
ABDOMINAL PAIN: 0
NAUSEA: 0

## 2021-05-23 ASSESSMENT — PAIN DESCRIPTION - DESCRIPTORS
DESCRIPTORS: PRESSURE
DESCRIPTORS: PRESSURE

## 2021-05-23 ASSESSMENT — PAIN DESCRIPTION - PAIN TYPE
TYPE: ACUTE PAIN
TYPE: ACUTE PAIN

## 2021-05-23 ASSESSMENT — PAIN DESCRIPTION - LOCATION
LOCATION: CHEST
LOCATION: CHEST

## 2021-05-23 ASSESSMENT — PAIN SCALES - GENERAL
PAINLEVEL_OUTOF10: 9
PAINLEVEL_OUTOF10: 0
PAINLEVEL_OUTOF10: 4
PAINLEVEL_OUTOF10: 0
PAINLEVEL_OUTOF10: 6

## 2021-05-23 NOTE — H&P
Hospitalist  History and Physical    Patient:  Norman Smith  MRN: [id number]  PCP: Katharine Moody MD    CHIEF COMPLAINT: Shortness of breath      HISTORY OF PRESENT ILLNESS:   The patient Norman Smith is a 76 y.o.male with medical history significant for GERD, hyperlipidemia. Patient presented to the emergency room with shortness of breath that started this morning. Patient was noted to be hypoxemic with oxygen saturation of 85. Patient is ex-smoker but patient denies history of COPD no history of cardiac disease. Patient responded to handheld nebulizer in the emergency room. No history of fever chills no history of cough or production of sputum. 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    Past Medical History:        Diagnosis Date    GERD (gastroesophageal reflux disease)     GERD    Hyperlipidemia     Movement disorder     L Rotator Cuff Repair       Past Surgical History:        Procedure Laterality Date    ROTATOR CUFF REPAIR         Medications Prior to Admission:    Prior to Admission medications    Medication Sig Start Date End Date Taking? Authorizing Provider   ibuprofen (ADVIL;MOTRIN) 600 MG tablet Take 1 tablet by mouth 2 times daily (with meals) 3/17/21   Mary Gomez MD   meloxicam [facility]) 7.5 MG tablet Take 1 tablet by mouth daily 1/28/21 3/29/21  Tino Xiao MD   aspirin 81 MG EC tablet Take 1 tablet by mouth daily 2/28/16   Aron Mccray MD   atorvastatin (LIPITOR) 40 MG tablet Take 1 tablet by mouth nightly 2/28/16   Aron Mccray MD   Ranitidine HCl (ZANTAC PO) Take by mouth as needed Indications: for reflux    Historical Provider, MD       Allergies:  Patient has no known allergies. Social History:   TOBACCO:   reports that he quit smoking about 40 years ago.  He has never used smokeless tobacco.  ETOH:   reports current alcohol use of about 2.0 standard drinks of alcohol per week. Family History:      Problem Relation Age of Onset    Cancer Mother     Heart Disease Father     High Cholesterol Father            REVIEW OF SYSTEMS:     patients reported symptoms are in BOLD all other symptoms are negative. CONSTITUTIONAL:      fatigue, fever, chills or night sweats, recent weight gain, recent wt loss, insomnia,  General weakness, poor appetite, muscle aches and pains    HEAD: headache, dizziness    EYES:      blurriness,  double vision, dryness,  discharge, irritation,diplopia    EARS:      hearing loss, vertigo, ear discharge,  Earache. Ringing in the ears. NOSE:      Rhinorrhea, sneezing, epistaxis. Discharge, sinusitis,     MOUTH/THROAT:         sore throat, mouth ulcers, Hoarseness    RESPIRATORY:        Shortness of breath, wheezing,  cough, sputum, hemoptysis, obstructive sleep apnea,    CARDIOVASCULAR :      chest pain, palpitations, dyspnea on exercise, Lower extrimity edema (swelling),     GASTROINTESTINAL:       Dysphagia, Poor appetite,  Nausea, Vomiting, diarrhea, heartburn, abdominal pain. Blood in the stools, hematemesis. Pain with swallowing, constipation    GENITOURINARY:       Urinary frequency, hesitancy,  urgency, Dysuria, hematuria,  Urinary Incontinence. Urinary Retention. GYNECOLOGICAL: vaginal bleeding , vaginal discharge, menopause    MUSCULOSKELETAL:       joint swelling or stiffness, joint pain, muscle pain, balance problems, low back pain. NEUROLOGICAL:      Gait problems. Tremor. Dizziness. Pain and paresthesias, weakness in extremities. Seizures, memory loss    PSYCHLOGICAL:        Anxiety, depression    SKIN :      Rashes ulcers, skin color changes, easy bruisability, lymphadenopathy      Physical Exam:      Vitals: BP (!) 143/71   Pulse 105   Temp 98.9 °F (37.2 °C)   Resp 25   Ht 5' 6\" (1.676 m)   Wt 181 lb 7 oz (82.3 kg)   SpO2 94%   BMI 29.28 kg/m²     Gen:          Alert and oriented x3  Eyes: PERRL.  No sclera icterus. No conjunctival injection. ENT: No discharge. Pharynx clear. External appearance of ears and nose normal.  Neck: Trachea midline. No obvious mass. Resp: Mild expiratory rhonchi  CV: Regular rate. Regular rhythm. No murmur or rub. No edema. GI: Non-tender. Non-distended. No hernia. Skin: Warm, dry, normal texture and turgor. Lymph: No cervical LAD. No supraclavicular LAD. M/S: / Ext. No cyanosis. No clubbing. No joint deformity. Neuro: Moves all four extremities. CN 2-12 tested, no deficits noted. Peripheral pulses and capillary refill is intact. CBC:   Recent Labs     05/23/21  1336   WBC 14.9*   HGB 14.7        BMP:    Recent Labs     05/23/21  1336      K 4.1      CO2 23   BUN 7   CREATININE 0.8   GLUCOSE 113*     Hepatic: No results for input(s): AST, ALT, ALB, BILITOT, ALKPHOS in the last 72 hours. Troponin:   Recent Labs     05/23/21  1336   TROPONINI <0.01     BNP: No results for input(s): BNP in the last 72 hours. INR: No results for input(s): INR in the last 72 hours. Lab Results   Component Value Date    LABA1C 5.8 02/28/2016           No results for input(s): CKTOTAL in the last 72 hours. -----------------------------------------------------------------      CT angiogram of the chest  No evidence of pulmonary embolism  No acute cardiopulmonary disease process. Assessment / Plan     Reactive airway disease  Start patient on Solu-Medrol  Bronchodilators and antibiotics  Consult to pulmonary  Check echocardiogram to rule out CHF    Hyperlipidemia  E78.5  No current evidence of Rhabdomyolysis or other adverse effects. Continue statin therapy while in the hospital      DVT and GI prophylaxis      Prior      Sebastian Santos MD M.D    This note was transcribed using [id number] [employer]. Please disregard any translational errors.

## 2021-05-23 NOTE — ED PROVIDER NOTES
I independently evaluated and obtained a history and physical on Ramos Keating. All diagnostic, treatment, and disposition assistants were made to myself in conjunction the advanced practice provider. For further details of this patient's emergency department encounter, please see the advanced practice provider's documentation. History: This is a 67-year-old male with a prior history of Covid sometime ago. He has no past medical history of reactive airway disease to include COPD. Count is 135 chest discomfort she states is been substernal and constant. He he described to me as a pressure but at times sharp. It is worse with activity. He has had a nonproductive cough today without fever. Physician Exam: Ill-appearing male who is tachypneic with some accessory muscle use. His breath sounds revealed expiratory wheezing throughout. He has a prolonged SHONA. His cardiac exam at the time I examined him was mildly tachycardic without murmurs rubs or gallops. The Ekg interpreted by me shows  normal sinus rhythm with a rate of 85  Axis is   72  QTc is  411 msec  Intervals and Durations are unremarkable. ST Segments: nonspecific changes  No significant change from prior EKG dated 1/5/2018      While this patient has no past medical history of reactive airway disease he was presenting as such. His blood gas showed a normal PCO2 and a normal pH. His rapid Covid was negative. His proBNP was normal his white count was 14.9 but no other significant lab abnormalities were noted. Patient had some chronic findings in his chest without any acute airspace disease identified and because of his marked shortness of breath a CT of the chest was undertaken to evaluate for PE and we did not find any evidence of a PE or any acute pulmonary abnormality. The patient did respond well to oral prednisone and albuterol treatments in the emergency department.   He will be admitted to internal medicine for further evaluation and treatment.      Chichi Rolle MD  05/24/21 3572

## 2021-05-23 NOTE — ED PROVIDER NOTES
[address] [facility]      Pt Name: Marcia Alarcon  MRN: [id number]  [place] 1955  Date of evaluation: 5/23/2021  Provider: MONTRELL Brown    This patient was seen and evaluated by the attending physician Dr. Jerome Shi   Patient presents with    Chest Pain     Started last night, states mid sternal pressure    Shortness of Breath         HISTORY OF PRESENT ILLNESS  (Location/Symptom, Timing/Onset, Context/Setting, Quality, Duration, Modifying Factors, Severity.)   Marcia Alarcon is a 76 y.o. male who presents to the emergency department for chest pain and shortness of breath. Chest pain is substernal and started at 9am this morning when he was getting out of bed. It woke him up and has been constant since then. Radiates to the right neck. Described as a pressure and sharp. chest pain is worse with activity. Has associated shortness of breath, worse with exertion that started around 10 AM this morning. Patient reports he felt fine yesterday. However wife reports he did sleep a lot yesterday. Has had a nonproductive cough today. Denies fever, chills, nausea, vomiting, abdominal pain. Denies history of CHF, VTE, MI, CAD. Has a history of hyperlipidemia. Denies history of hypertension, diabetes. Quit smoking many years ago. Does have a family history of MI.       Nursing Notes were reviewed and I agree. REVIEW OF SYSTEMS    (2-9 systems for level 4, 10 or more for level 5)     Review of Systems   Constitutional: Negative for chills and fever. Respiratory: Positive for cough and shortness of breath. Neg for sputum   Cardiovascular: Positive for chest pain. Gastrointestinal: Negative for abdominal pain, nausea and vomiting. Except as noted above the remainder of the review of systems was reviewed and negative.        PAST MEDICAL HISTORY         Diagnosis Date    GERD (gastroesophageal reflux disease)     GERD    Hyperlipidemia     Movement disorder     L Rotator Cuff Repair       SURGICAL HISTORY           Procedure Laterality Date    ROTATOR CUFF REPAIR         CURRENT MEDICATIONS       Current Discharge Medication List      CONTINUE these medications which have NOT CHANGED    Details   ibuprofen (ADVIL;MOTRIN) 600 MG tablet Take 1 tablet by mouth 2 times daily (with meals)  Qty: 60 tablet, Refills: 1      meloxicam (MOBIC) 7.5 MG tablet Take 1 tablet by mouth daily  Qty: 30 tablet, Refills: 1      aspirin 81 MG EC tablet Take 1 tablet by mouth daily  Qty: 30 tablet, Refills: 3      Ranitidine HCl (ZANTAC PO) Take by mouth as needed Indications: for reflux      atorvastatin (LIPITOR) 40 MG tablet Take 1 tablet by mouth nightly  Qty: 30 tablet, Refills: 3             ALLERGIES     Patient has no known allergies. FAMILY HISTORY           Problem Relation Age of Onset    Cancer Mother     Heart Disease Father     High Cholesterol Father      Family Status   Relation Name Status    Mother  (Not Specified)    Father  (Not Specified)        SOCIAL HISTORY      reports that he quit smoking about 40 years ago. He has never used smokeless tobacco. He reports current alcohol use of about 2.0 standard drinks of alcohol per week. He reports that he does not use drugs. PHYSICAL EXAM    (up to 7 for level 4, 8 or more for level 5)     ED Triage Vitals [05/23/21 1332]   BP Temp Temp Source Pulse Resp SpO2 Height Weight   (!) 156/83 99.3 °F (37.4 °C) Temporal 85 20 (S) (!) 85 % 5' 6\" (1.676 m) 181 lb 7 oz (82.3 kg)       Physical Exam  Constitutional:       General: He is not in acute distress. Appearance: Normal appearance. He is well-developed. He is not ill-appearing, toxic-appearing or diaphoretic. HENT:      Head: Normocephalic and atraumatic. Cardiovascular:      Rate and Rhythm: Normal rate and regular rhythm. Heart sounds: Normal heart sounds.    Pulmonary:      Effort: Respiratory distress (mild) present. Breath sounds: No stridor. No wheezing or rhonchi. Comments: Shallow breaths  tachypneic with rate 30-40s  Abdominal:      General: There is no distension. Palpations: Abdomen is soft. There is no mass. Tenderness: There is no abdominal tenderness. There is no guarding or rebound. Hernia: No hernia is present. Musculoskeletal:      Cervical back: Normal range of motion and neck supple. Right lower leg: No edema. Left lower leg: No edema. Comments: No calf tenderness bilaterally   Skin:     General: Skin is warm. Neurological:      Mental Status: He is alert. Psychiatric:         Mood and Affect: Mood normal.         Behavior: Behavior normal.         Thought Content: Thought content normal.         Judgment: Judgment normal.         DIFFERENTIAL DIAGNOSIS   Acute Myocardial Infarction, Acute Coronary Syndrome, Pneumothorax, Aortic Dissection, Pulmonary Embolism, Pneumonia  COVId, other    DIAGNOSTICRESULTS     EKG: All EKG's are interpreted by MONTRELL Allen in the absence of a cardiologist.    EKg obtained. See Dr. Mago Gonzales note for interpretation. RADIOLOGY:   Non-plain film images such as CT, Ultrasound and MRI are read by the radiologist. Plain radiographic images are visualized and preliminarily interpreted by MONTRELL Allen with the below findings:      Interpretation per the Radiologist below, if available at the time of this note:    CT CHEST PULMONARY EMBOLISM W CONTRAST   Final Result   No evidence of pulmonary embolism or acute pulmonary abnormality. Evidence for old granulomatous disease         XR CHEST (2 VW)   Final Result   Chronic findings in the chest without acute airspace disease identified.                LABS:  Results for orders placed or performed during the hospital encounter of 05/23/21   COVID-19, Rapid   Result Value Ref Range    SARS-CoV-2, NAAT Not Detected Not Detected   CBC Auto Differential   Result Value Ref Range    WBC 14.9 (H) 4.0 - 11.0 K/uL    RBC 4.97 4.20 - 5.90 M/uL    Hemoglobin 14.7 13.5 - 17.5 g/dL    Hematocrit 43.6 40.5 - 52.5 %    MCV 87.7 80.0 - 100.0 fL    MCH 29.5 26.0 - 34.0 pg    MCHC 33.7 31.0 - 36.0 g/dL    RDW 14.0 12.4 - 15.4 %    Platelets 351 172 - 184 K/uL    MPV 7.7 5.0 - 10.5 fL    Neutrophils % 87.4 %    Lymphocytes % 7.6 %    Monocytes % 4.3 %    Eosinophils % 0.6 %    Basophils % 0.1 %    Neutrophils Absolute 13.0 (H) 1.7 - 7.7 K/uL    Lymphocytes Absolute 1.1 1.0 - 5.1 K/uL    Monocytes Absolute 0.6 0.0 - 1.3 K/uL    Eosinophils Absolute 0.1 0.0 - 0.6 K/uL    Basophils Absolute 0.0 0.0 - 0.2 K/uL   Basic Metabolic Panel w/ Reflex to MG   Result Value Ref Range    Sodium 141 136 - 145 mmol/L    Potassium reflex Magnesium 4.1 3.5 - 5.1 mmol/L    Chloride 104 99 - 110 mmol/L    CO2 23 21 - 32 mmol/L    Anion Gap 14 3 - 16    Glucose 113 (H) 70 - 99 mg/dL    BUN 7 7 - 20 mg/dL    CREATININE 0.8 0.8 - 1.3 mg/dL    GFR Non-African American >60 >60    GFR African American >60 >60    Calcium 8.8 8.3 - 10.6 mg/dL   Troponin   Result Value Ref Range    Troponin <0.01 <0.01 ng/mL   Brain Natriuretic Peptide   Result Value Ref Range    Pro-BNP 23 0 - 124 pg/mL   Blood Gas, Venous   Result Value Ref Range    pH, Kal 7.357 7.350 - 7.450    pCO2, Kal 47.8 40.0 - 50.0 mmHg    pO2, Kal 39 Not Established mmHg    HCO3, Venous 27 23 - 29 mmol/L    Base Excess, Kal 0.6 Not Established mmol/L    O2 Sat, Kal 70 Not Established %    Carboxyhemoglobin <1.0 %    MetHgb, Kal 0.4 <1.5 %    TC02 (Calc), Kal 28 Not Established mmol/L    O2 Therapy Unknown        All other labs were withinnormal range or not returned as of this dictation.     EMERGENCY DEPARTMENT COURSE and DIFFERENTIAL DIAGNOSIS/MDM:   Vitals:    Vitals:    05/23/21 1653 05/23/21 1700 05/23/21 1715 05/23/21 1740   BP: (!) 140/70 (!) 143/71  134/66   Pulse: 109 110 105 103   Resp: 24 23 25 16   Temp: 98.9 °F (37.2 °C) 98.2 °F (36.8 °C)   TempSrc:    Oral   SpO2: 95% 95% 94% 93%   Weight:       Height:           Patient was in mild respiratory distress on arrival.  Oxygen saturation was 85% on room air. He is not on oxygen at home. This is new oxygen requirement. Is tachypneic with a rate in the 30s-40s. No wheezing. No history of lung issues. Was placed on nasal cannula. Given 2 duo nebs, which he reports did help a little bit. Upon reevaluation, respiratory rate is in the 20-30s. CBC with leukocytosis 14.9. BMP normal.  Trop negative. BNP not elevated. COVID negative. CXR with chronic findings in the chest without acute airspace disease identified. CT chest showed no evidence of PE or acute pulmonary abnormality. Unclear cause of his hypoxia and acute respiratory failure. May have some reactive airway disease since he did respond to the duonebs. Was given prednisone. He requires admission. I consulted medicine and patient was accepted for admission. Upon reevaluation again, he is sitting in stretcher and continues to appear improved. The mild respiratory distress has resolved. CRITICAL CARE TIME   Total Critical Care time was 35 minutes, excluding separately reportable procedures. There was a high probability of clinically significant/life threatening deterioration in the patient's condition which required my urgent intervention. Time was spent managing patient's hypoxia and acute respiratory failure with oxygen therapy, nebs, reevaluations and consultation. PROCEDURES:  None    FINAL IMPRESSION      1. Acute respiratory failure with hypoxia ([address]    2. Tachypnea    3. Leukocytosis, unspecified type    4. Reactive airway disease without complication, unspecified asthma severity, unspecified whether persistent          DISPOSITION/PLAN   DISPOSITION Admitted 05/23/2021 04:52:50 PM      PATIENT REFERRED TO:  No follow-up provider specified.     DISCHARGE MEDICATIONS:  Current Discharge Medication List (Please note that portions of this note werecompleted with a voice recognition program.  Efforts were made to edit the dictations but occasionally words are mis-transcribed.)    Sean Cordova, [address] [place] [address]  05/23/21 5375

## 2021-05-23 NOTE — LETTER
[address]  Phone: [phone]             May 24, 2021    Patient: Lawyer Barba   YOB: 1955   Date of Visit: 5/23/2021       To Whom It May Concern:    Yovani Schaffer was seen and treated in our facility  beginning 5/23/2021 until 5/24/21. He may return to work on Wednesday 5/26/21.       Sincerely,       Rea Fabian RN         Signature:__________________________________

## 2021-05-24 VITALS
WEIGHT: 181.44 LBS | SYSTOLIC BLOOD PRESSURE: 150 MMHG | DIASTOLIC BLOOD PRESSURE: 65 MMHG | BODY MASS INDEX: 29.16 KG/M2 | HEART RATE: 98 BPM | HEIGHT: 66 IN | RESPIRATION RATE: 18 BRPM | OXYGEN SATURATION: 94 % | TEMPERATURE: 98.1 F

## 2021-05-24 LAB
BASOPHILS ABSOLUTE: 0 K/UL (ref 0–0.2)
BASOPHILS RELATIVE PERCENT: 0.2 %
EKG ATRIAL RATE: 84 BPM
EKG ATRIAL RATE: 85 BPM
EKG DIAGNOSIS: NORMAL
EKG DIAGNOSIS: NORMAL
EKG P AXIS: 59 DEGREES
EKG P AXIS: 72 DEGREES
EKG P-R INTERVAL: 136 MS
EKG P-R INTERVAL: 166 MS
EKG Q-T INTERVAL: 346 MS
EKG Q-T INTERVAL: 346 MS
EKG QRS DURATION: 84 MS
EKG QRS DURATION: 86 MS
EKG QTC CALCULATION (BAZETT): 408 MS
EKG QTC CALCULATION (BAZETT): 411 MS
EKG R AXIS: -7 DEGREES
EKG R AXIS: 10 DEGREES
EKG T AXIS: 39 DEGREES
EKG T AXIS: 41 DEGREES
EKG VENTRICULAR RATE: 84 BPM
EKG VENTRICULAR RATE: 85 BPM
EOSINOPHILS ABSOLUTE: 0 K/UL (ref 0–0.6)
EOSINOPHILS RELATIVE PERCENT: 0 %
HCT VFR BLD CALC: 38.7 % (ref 40.5–52.5)
HEMOGLOBIN: 12.9 G/DL (ref 13.5–17.5)
LV EF: 63 %
LVEF MODALITY: NORMAL
LYMPHOCYTES ABSOLUTE: 0.8 K/UL (ref 1–5.1)
LYMPHOCYTES RELATIVE PERCENT: 4.6 %
MCH RBC QN AUTO: 29.3 PG (ref 26–34)
MCHC RBC AUTO-ENTMCNC: 33.4 G/DL (ref 31–36)
MCV RBC AUTO: 87.8 FL (ref 80–100)
MONOCYTES ABSOLUTE: 0.2 K/UL (ref 0–1.3)
MONOCYTES RELATIVE PERCENT: 1.2 %
NEUTROPHILS ABSOLUTE: 15.4 K/UL (ref 1.7–7.7)
NEUTROPHILS RELATIVE PERCENT: 94 %
PDW BLD-RTO: 14 % (ref 12.4–15.4)
PLATELET # BLD: 221 K/UL (ref 135–450)
PMV BLD AUTO: 7.7 FL (ref 5–10.5)
RBC # BLD: 4.41 M/UL (ref 4.2–5.9)
WBC # BLD: 16.4 K/UL (ref 4–11)

## 2021-05-24 PROCEDURE — 94761 N-INVAS EAR/PLS OXIMETRY MLT: CPT

## 2021-05-24 PROCEDURE — 6370000000 HC RX 637 (ALT 250 FOR IP): Performed by: HOSPITALIST

## 2021-05-24 PROCEDURE — 36415 COLL VENOUS BLD VENIPUNCTURE: CPT

## 2021-05-24 PROCEDURE — 6360000002 HC RX W HCPCS: Performed by: HOSPITALIST

## 2021-05-24 PROCEDURE — 93010 ELECTROCARDIOGRAM REPORT: CPT | Performed by: INTERNAL MEDICINE

## 2021-05-24 PROCEDURE — 93306 TTE W/DOPPLER COMPLETE: CPT

## 2021-05-24 PROCEDURE — 85025 COMPLETE CBC W/AUTO DIFF WBC: CPT

## 2021-05-24 PROCEDURE — 99255 IP/OBS CONSLTJ NEW/EST HI 80: CPT | Performed by: INTERNAL MEDICINE

## 2021-05-24 PROCEDURE — 94640 AIRWAY INHALATION TREATMENT: CPT

## 2021-05-24 RX ORDER — AZITHROMYCIN 250 MG/1
250 TABLET, FILM COATED ORAL DAILY
Qty: 3 TABLET | Refills: 0 | Status: SHIPPED | OUTPATIENT
Start: 2021-05-24

## 2021-05-24 RX ORDER — PREDNISONE 20 MG/1
40 TABLET ORAL DAILY
Qty: 10 TABLET | Refills: 0 | Status: SHIPPED | OUTPATIENT
Start: 2021-05-26 | End: 2021-05-31

## 2021-05-24 RX ADMIN — METHYLPREDNISOLONE SODIUM SUCCINATE 40 MG: 40 INJECTION, POWDER, FOR SOLUTION INTRAMUSCULAR; INTRAVENOUS at 11:49

## 2021-05-24 RX ADMIN — METHYLPREDNISOLONE SODIUM SUCCINATE 40 MG: 40 INJECTION, POWDER, FOR SOLUTION INTRAMUSCULAR; INTRAVENOUS at 06:07

## 2021-05-24 RX ADMIN — ASPIRIN 81 MG: 81 TABLET, COATED ORAL at 08:21

## 2021-05-24 RX ADMIN — METHYLPREDNISOLONE SODIUM SUCCINATE 40 MG: 40 INJECTION, POWDER, FOR SOLUTION INTRAMUSCULAR; INTRAVENOUS at 00:47

## 2021-05-24 RX ADMIN — IPRATROPIUM BROMIDE AND ALBUTEROL SULFATE 1 AMPULE: .5; 3 SOLUTION RESPIRATORY (INHALATION) at 12:31

## 2021-05-24 RX ADMIN — FAMOTIDINE 20 MG: 20 TABLET, FILM COATED ORAL at 08:21

## 2021-05-24 RX ADMIN — ENOXAPARIN SODIUM 40 MG: 40 INJECTION SUBCUTANEOUS at 08:21

## 2021-05-24 RX ADMIN — IPRATROPIUM BROMIDE AND ALBUTEROL SULFATE 1 AMPULE: .5; 3 SOLUTION RESPIRATORY (INHALATION) at 16:01

## 2021-05-24 RX ADMIN — IPRATROPIUM BROMIDE AND ALBUTEROL SULFATE 1 AMPULE: .5; 3 SOLUTION RESPIRATORY (INHALATION) at 08:38

## 2021-05-24 ASSESSMENT — PAIN DESCRIPTION - PAIN TYPE: TYPE: ACUTE PAIN

## 2021-05-24 ASSESSMENT — PAIN DESCRIPTION - DESCRIPTORS: DESCRIPTORS: ACHING

## 2021-05-24 ASSESSMENT — PAIN - FUNCTIONAL ASSESSMENT: PAIN_FUNCTIONAL_ASSESSMENT: ACTIVITIES ARE NOT PREVENTED

## 2021-05-24 ASSESSMENT — PAIN DESCRIPTION - LOCATION: LOCATION: CHEST

## 2021-05-24 ASSESSMENT — PAIN DESCRIPTION - ORIENTATION: ORIENTATION: MID

## 2021-05-24 ASSESSMENT — PAIN SCALES - GENERAL: PAINLEVEL_OUTOF10: 1

## 2021-05-24 NOTE — CARE COORDINATION
SW attempted to meet with patient at bedside to review intake assessment, however, he was away for testing. BUNNY will try again later if time permits. Respectfully submitted,    JIMMY Presley  [facility]   [phone]    Electronically signed by JIMMY Su on 5/24/2021 at 2:53 PM

## 2021-05-24 NOTE — PLAN OF CARE
Problem: Infection:  Goal: Will remain free from infection  Description: Will remain free from infection  5/24/2021 0257 by Gonzalez Lee RN  Outcome: Ongoing     Problem: Safety:  Goal: Free from accidental physical injury  Description: Free from accidental physical injury  5/24/2021 0257 by Gonzalez Lee RN  Outcome: Ongoing  Goal: Free from intentional harm  Description: Free from intentional harm  5/24/2021 0257 by Gonzalez Lee RN  Outcome: Ongoing     Problem: Daily Care:  Goal: Daily care needs are met  Description: Daily care needs are met  5/24/2021 0257 by Gonzalez Lee RN  Outcome: Ongoing     Problem: Pain:  Goal: Patient's pain/discomfort is manageable  Description: Patient's pain/discomfort is manageable  5/24/2021 0257 by Gonzalez Lee RN  Outcome: Ongoing     Problem: Skin Integrity:  Goal: Skin integrity will stabilize  Description: Skin integrity will stabilize  5/24/2021 0257 by Gonzalez Lee RN  Outcome: Ongoing     Problem: Discharge Planning:  Goal: Patients continuum of care needs are met  Description: Patients continuum of care needs are met  5/24/2021 0257 by Gonzalez Lee RN  Outcome: Ongoing

## 2021-05-24 NOTE — PROGRESS NOTES
Physician Progress Note      PATIENT:               John Shah  CSN #:                  [id number]  :                       1955  ADMIT DATE:       2021 1:34 PM  [address] [place] DATE:  Roque Graft  PROVIDER #:        Redd Aldridge MD          QUERY TEXT:    Dear Dr Hailey Owen,    Pt admitted with  shortness of breath. .  Pt noted to have tachypnea and low O2   Sats. . If possible, please document in the progress notes and discharge   summary if you are evaluating and/or treating any of the following: The medical record reflects the following:  Risk Factors: Current admission for bronchospasms. Clinical Indicators: In ED- RR 44, O2 sat- 85% RA, sat 98% 2L. Adelaide Wilson Per ED   dictation- Pulmonary:    Effort: Respiratory distress (mild) present. Breath sounds: No stridor. No wheezing or rhonchi. Comments: Shallow   breaths tachypneic with rate 30-40s  Treatment: Duonebs, prednisone, Solumedrol iv, Pulmonary consult,    Thank You,  Wilma Hernandez RN BSN CDS CRCR  [email or web address]  Options provided:  -- Acute respiratory failure with hypoxia  -- Acute respiratory failure with hypercapnia  -- Chronic respiratory failure with hypoxia  -- Chronic respiratory failure with hypercapnia  -- Acute on chronic respiratory failure with hypoxia  -- Acute on chronic respiratory failure with hypercapnia  -- Other - I will add my own diagnosis  -- Disagree - Not applicable / Not valid  -- Disagree - Clinically unable to determine / Unknown  -- Refer to Clinical Documentation Reviewer    PROVIDER RESPONSE TEXT:    This patient is in acute respiratory failure with hypoxia.     Query created by: Jj Macdonald on 2021 12:55 PM      Electronically signed by:  Redd Aldridge MD 2021 1:06 PM

## 2021-05-24 NOTE — PLAN OF CARE
Problem: Infection:  Goal: Will remain free from infection  Description: Will remain free from infection  5/24/2021 1022 by Hannah Barry RN  Outcome: Ongoing  5/24/2021 0257 by Alessia Perez RN  Outcome: Ongoing     Problem: Daily Care:  Goal: Daily care needs are met  Description: Daily care needs are met  5/24/2021 1022 by Hannah Barry RN  Outcome: Ongoing  5/24/2021 0257 by Alessia Perez RN  Outcome: Ongoing     Problem: Skin Integrity:  Goal: Skin integrity will stabilize  Description: Skin integrity will stabilize  5/24/2021 1022 by Hannah Barry RN  Outcome: Ongoing  5/24/2021 0257 by Alessia Perez RN  Outcome: Ongoing

## 2021-05-24 NOTE — CARE COORDINATION
INITIAL CASE MANAGEMENT ASSESSMENT     Reviewed chart, met with patient to assess possible discharge needs. Explained Case Management role/services. SW interviewed patient and wife at bedside today. Patient gave this writer permission to speak freely in front of family.     Living Situation: Patient reports that he resides in a mobile home with his wife and one cat. There are three stairs leading up to the front door. Prior to medical admission he stated that he had no trouble getting in and out of the property.     ADLs: Prior to medical admission patient reports that he was independent. He stated that he doesn't anticipate any needs at discharge.     DME: Prior to medical admission patient reports that he used no durable medical equipment. She stated that she doesn't anticipate any needs at discharge.     PT/OT Recs: Since this morning patient reports thatshe was ambulating without asssitance. He stated that he doesn't anticipate any needs at discharge.     Active Services: Prior to medical admission patient reports that \he had no active services in place. He stated that he doesn't anticipate any needs at discharge.      Transportation: Prior to medical admission patient reports that he was an active . He stated that his wife will likely transport him home at discharge.     Medications: Patient receives medications from [facility] on [address]. At this time he reports no issues with access or affordability.      PCP: Danica Woody [phone] (phone) and [phone] (fax number). Patient reports that his last appointment with this provider was over six months ago.   Hans Lindsey  HD/PD: N/A     PLAN/COMMENTS:   1) Work note needed prior to discharge. 2) Discharge to home with family when stable.     SW provided contact information for patient or family to call with any questions. SW will follow and assist as needed.     Respectfully submitted,     Rosey Miles MSW, LISW-S  [address] [facility] Worker  [phone]    Electronically signed by JIMMY Harris on 5/24/2021 at 4:38 PM

## 2021-05-24 NOTE — PROGRESS NOTES
Echo results called to Dr Lieutenant Fuentes, pt hopeful to discharge today due to symptom improvement.

## 2021-05-24 NOTE — PROGRESS NOTES
IV removed. Telemetry removed. Discharge instructions reviewed with patient and wife. Verbalized understanding of follow ups needed and where to  prescriptions. Pt discharged home ambulatory at this time with wife as transport.

## 2021-05-24 NOTE — CONSULTS
Patient is seen at the request of Dr. Olive Carreon. Elisa Lange for hypoxia    PCP: Ty Gill MD    HISTORY OF PRESENT ILLNESS: 76y.o. year old male who was admitted on 5/23/21 for respiratory failure. He presented with a several hour history of chest tightness, shortness of breath and dry cough. He says his symptoms began after waking up. His symptoms worsened with exertion. He decided to come to the ER due to persistence of his symptoms. In the ER he was noted to be tachypneic to the 30s and 40s and hypoxic to 85%. He was treated with Duonebs which improved his symptoms.      PAST MEDICAL HISTORY:  Past Medical History:   Diagnosis Date    GERD (gastroesophageal reflux disease)     GERD    Hyperlipidemia     Movement disorder     L Rotator Cuff Repair       PAST SURGICAL HISTORY:  Past Surgical History:   Procedure Laterality Date    ROTATOR CUFF REPAIR           MEDICATIONS:  Current Facility-Administered Medications: aspirin EC tablet 81 mg, 81 mg, Oral, Daily  atorvastatin (LIPITOR) tablet 40 mg, 40 mg, Oral, Nightly  0.9 % sodium chloride infusion, , Intravenous, Continuous  sodium chloride flush 0.9 % injection 5-40 mL, 5-40 mL, Intravenous, 2 times per day  sodium chloride flush 0.9 % injection 5-40 mL, 5-40 mL, Intravenous, PRN  0.9 % sodium chloride infusion, 25 mL, Intravenous, PRN  promethazine (PHENERGAN) tablet 12.5 mg, 12.5 mg, Oral, Q6H PRN **OR** ondansetron (ZOFRAN) injection 4 mg, 4 mg, Intravenous, Q6H PRN  polyethylene glycol (GLYCOLAX) packet 17 g, 17 g, Oral, Daily PRN  enoxaparin (LOVENOX) injection 40 mg, 40 mg, Subcutaneous, Daily  acetaminophen (TYLENOL) tablet 650 mg, 650 mg, Oral, Q6H PRN **OR** acetaminophen (TYLENOL) suppository 650 mg, 650 mg, Rectal, Q6H PRN  ipratropium-albuterol (DUONEB) nebulizer solution 1 ampule, 1 ampule, Inhalation, Q4H WA  azithromycin (ZITHROMAX) 500 mg in D5W 250ml addavial, 500 mg, Intravenous, Q24H  methylPREDNISolone sodium (SOLU-MEDROL) injection 40 mg, 40 mg, Intravenous, Q6H **FOLLOWED BY** [START ON 2021] predniSONE (DELTASONE) tablet 40 mg, 40 mg, Oral, Daily  famotidine (PEPCID) tablet 20 mg, 20 mg, Oral, BID      ALLERGIES:  Patient has No Known Allergies. FAMILY HISTORY:  family history includes Cancer in his mother; Heart Disease in his father; High Cholesterol in his father. SOCIAL HISTORY:  Social History     Socioeconomic History    Marital status:      Spouse name: Not on file    Number of children: Not on file    Years of education: Not on file    Highest education level: Not on file   Occupational History    Not on file   Tobacco Use    Smoking status: Former Smoker     Quit date: 3/3/1981     Years since quittin.2    Smokeless tobacco: Never Used   Vaping Use    Vaping Use: Never used   Substance and Sexual Activity    Alcohol use: Yes     Alcohol/week: 2.0 standard drinks     Types: 2 Cans of beer per week    Drug use: No    Sexual activity: Yes     Partners: Female   Other Topics Concern    Not on file   Social History Narrative    Not on file     Social Determinants of Health     Financial Resource Strain:     Difficulty of Paying Living Expenses:    Food Insecurity:     Worried About Running Out of Food in the Last Year:     [address] in the Last Year:    Transportation Needs:     Lack of Transportation (Medical):      Lack of Transportation (Non-Medical):    Physical Activity:     Days of Exercise per Week:     Minutes of Exercise per Session:    Stress:     Feeling of Stress :    Social Connections:     Frequency of Communication with Friends and Family:     Frequency of Social Gatherings with Friends and Family:     Attends Anab[employer]ism Services:     Active Member of Clubs or Organizations:     Attends Club or Organization Meetings:     Marital Status:    Intimate Partner Violence:     Fear of Current or Ex-Partner:     Emotionally Abused:     Physically Abused:     Sexually Abused: reports that he quit smoking about 40 years ago. He has never used smokeless tobacco.  Smoked 0.5ppd x ~48 years. Quit 1 year ago  Has worked as a  for ~40 years    REVIEW OF SYSTEMS:  Constitutional: Negative for fever  HENT: Negative for sore throat  Eyes: Negative for redness   Respiratory: + for dyspnea, +cough  Cardiovascular: Negative for chest pain, +chest tightness  Gastrointestinal: Negative for vomiting, diarrhea   Genitourinary: Negative for hematuria   Musculoskeletal: Negative for arthralgias   Skin: Negative for rash  Neurological: Negative for syncope  Hematological: Negative for adenopathy  Psychiatric/Behavorial: Negative for anxiety    Objective:   PHYSICAL EXAM:  Blood pressure (!) 144/84, pulse 99, temperature 98.4 °F (36.9 °C), temperature source Oral, resp. rate 16, height 5' 6\" (1.676 m), weight 181 lb 7 oz (82.3 kg), SpO2 93 %. on RA    Gen: Well developed; well nourished  Eyes: No scleral icterus. No conjunctival injection. ENT:  Oropharynx clear. External appearance of ears and nose normal.  Neck: Trachea midline. No obvious mass. No visible thyroid enlargement    Respiratory: Clear to auscultation bilaterally, no accessory muscle use  Cardiovascular: Regular rate and rhythm, no appreciable murmurs. No edema. Gastrointestinal: Soft, non-tender. No hernia  Skin: Warm and dry. No rashes or ulcers on visible areas. Normal texture and turgor  Lymphatic: No cervical LAD. No supraclavicular LAD. Musculoskeletal: No cyanosis, clubbing or joint deformity.     Psychiatric: Normal mood and affect; exhibits normal insight and judgement       Data Reviewed:   LABS:  CBC:   Recent Labs     05/23/21  1336 05/24/21  0722   WBC 14.9* 16.4*   HGB 14.7 12.9*   HCT 43.6 38.7*   MCV 87.7 87.8    221     BMP:   Recent Labs     05/23/21  1336      K 4.1      CO2 23   BUN 7   CREATININE 0.8     LIVER PROFILE: No results for input(s): AST, ALT, LIPASE, BILIDIR, BILITOT, ALKPHOS in the last 72 hours. Invalid input(s): AMYLASE,  ALB  PT/INR: No results for input(s): PROTIME, INR in the last 72 hours. APTT: No results for input(s): APTT in the last 72 hours. Images and reports from chest imaging were reviewed by me. My interpretation is:  CXR (5/23/21): Scarring in the right upper lobe and granulomas bilaterally (unchanged compared to January 2018)  Chest CT (5/23/21): Calcified mediastinal and hilar lymph nodes; scarring in the right upper lobe; small bilateral nodules many of which are calcified      ECHO (3/11/16)  Summary   Left ventricle size is normal.   Normal left ventricular wall thickness. Ejection fraction is visually estimated to be 50 to 55%. No regional wall motion abnormalities are noted. There is reversal of E/A inflow velocities across the mitral valve   suggesting impaired left ventricul   IVC respiratory change in dimension > 50% . A bubble study was performed and it was negative for shunting      Assessment:     Bronchospasm  Lung nodules    Plan:      Bronchospasm  -On Solumedrol. Would give prednisone 40mg daily x 5 days on discharge  -Duonebs every 4 hours. Would give albuterol inhaler as needed on discharge  -Needs outpatient PFTs    Lung nodules  -These were [place] [facility]. He would like to follow up here. Will arrange outpatient follow up. Thank you for allowing me to participate in the care of this patient. Will sign off. Please call with any questions or concerns.  Pulmonary follow up scheduled with Dr. Pool Rojas on 6/16/21 at 1:40pm.      Cristian Godinez MD  [facility] Pulmonology, Critical Care and Sleep

## 2021-05-25 DIAGNOSIS — R06.02 SOB (SHORTNESS OF BREATH): Primary | ICD-10-CM

## 2021-05-25 NOTE — DISCHARGE SUMMARY
Hospital Medicine Discharge Summary      Patient ID: Sarabjit Carrizales , [id number]     Patient's PCP: Micheal Maria MD    Admit Date: 5/23/2021     Discharge Date: 5/24/2021      Admitting Physician: Karena Rubin MD    Discharge Physician: Ariella Damon MD     Discharge Diagnoses: Active Hospital Problems    Diagnosis Date Noted    Bronchospasm [J98.01]     Lung nodules [R91.8]     SOB (shortness of breath) [R06.02] 05/23/2021         The patient was seen and examined on the day of discharge and this discharge summary is in conjunction with any daily progress note from day of discharge. HOSPITAL COURSE    Patient demographics:  The patient  Sarabjit Carrizales is a 76 y.o. male      Significant past medical history:       Patient Active Problem List   Diagnosis    Paresthesia of right arm and leg    SOB (shortness of breath)    Bronchospasm    Lung nodules            Presenting symptoms:  SOB     Diagnostic workup:           Ct Chest pulmonary embolism W contrast         CONSULTS DURING ADMISSION :   IP CONSULT TO PULMONOLOGY        Patient was diagnosed with:  Acute respiratory failure  Bronchospasm  Reactive airway disease        Treatment while inpatient:  Patient presented to the emergency room with worsening shortness of breath. Patient was diagnosed with acute bronchospasm most likely due to reactive airway disease. Patient was started on bronchodilators and steroids. Patient responded to the treatment well. Patient will be discharged on 5 days of prednisone. Pulmonary recommends patient should follow-up as an outpatient for follow-up pulmonary function testing. Patient's echocardiogram showed normal ejection fraction.     Discharge Condition:  stable      Discharged to:  Home      Activity:   as tolerated:     Follow Up: Follow-up with PCP in 1-2 weeks         Labs:  For convenience and continuity at follow-up the following most recent labs are provided:      CBC:   Lab Results   Component Value Date    WBC 16.4 05/24/2021    HGB 12.9 05/24/2021    HCT 38.7 05/24/2021     05/24/2021       RENAL:   Lab Results   Component Value Date     05/23/2021    K 4.1 05/23/2021     05/23/2021    CO2 23 05/23/2021    BUN 7 05/23/2021    CREATININE 0.8 05/23/2021           Discharge Medications:    Simone Whitney   Home Medication Instructions GDS:[id number]    Printed on:05/24/21 2209   Medication Information                      aspirin 81 MG EC tablet  Take 1 tablet by mouth daily             atorvastatin (LIPITOR) 40 MG tablet  Take 1 tablet by mouth nightly             azithromycin (ZITHROMAX) 250 MG tablet  Take 1 tablet by mouth daily             ibuprofen (ADVIL;MOTRIN) 600 MG tablet  Take 1 tablet by mouth 2 times daily (with meals)             meloxicam (MOBIC) 7.5 MG tablet  Take 1 tablet by mouth daily             predniSONE (DELTASONE) 20 MG tablet  Take 2 tablets by mouth daily for 5 days             Ranitidine HCl (ZANTAC PO)  Take by mouth as needed Indications: for reflux                    Time Spent on discharge is more than 30 min in the examination, evaluation, counseling and review of medications and discharge plan. Signed:  Mateus Brown MD   5/24/2021      Thank you Devon Real MD for the opportunity to be involved in this patient's care. If you have any questions or concerns please feel free to contact me at [phone]. This note was transcribed using [id number] [employer]. Please disregard any translational errors.

## 2021-05-26 ENCOUNTER — HOSPITAL ENCOUNTER (OUTPATIENT)
Dept: PULMONOLOGY | Age: 66
Discharge: HOME | End: 2021-05-26
Payer: COMMERCIAL

## 2021-05-26 PROCEDURE — 94640 AIRWAY INHALATION TREATMENT: CPT

## 2021-05-26 PROCEDURE — 94729 DIFFUSING CAPACITY: CPT

## 2021-05-26 PROCEDURE — 6370000000 HC RX 637 (ALT 250 FOR IP): Performed by: INTERNAL MEDICINE

## 2021-05-26 PROCEDURE — 94726 PLETHYSMOGRAPHY LUNG VOLUMES: CPT

## 2021-05-26 PROCEDURE — 94060 EVALUATION OF WHEEZING: CPT

## 2021-05-26 RX ORDER — ALBUTEROL SULFATE 90 UG/1
4 AEROSOL, METERED RESPIRATORY (INHALATION) ONCE
Status: COMPLETED | OUTPATIENT
Start: 2021-05-26 | End: 2021-05-26

## 2021-05-26 RX ADMIN — ALBUTEROL SULFATE 4 PUFF: 90 AEROSOL, METERED RESPIRATORY (INHALATION) at 13:09

## 2021-06-08 PROCEDURE — 94726 PLETHYSMOGRAPHY LUNG VOLUMES: CPT | Performed by: INTERNAL MEDICINE

## 2021-06-08 PROCEDURE — 94729 DIFFUSING CAPACITY: CPT | Performed by: INTERNAL MEDICINE

## 2021-06-08 PROCEDURE — 94060 EVALUATION OF WHEEZING: CPT | Performed by: INTERNAL MEDICINE

## 2021-06-16 ENCOUNTER — OFFICE VISIT (OUTPATIENT)
Dept: PULMONOLOGY | Age: 66
End: 2021-06-16
Payer: COMMERCIAL

## 2021-06-16 VITALS
BODY MASS INDEX: 28.57 KG/M2 | SYSTOLIC BLOOD PRESSURE: 122 MMHG | HEART RATE: 88 BPM | OXYGEN SATURATION: 94 % | RESPIRATION RATE: 16 BRPM | HEIGHT: 66 IN | TEMPERATURE: 97.8 F | WEIGHT: 177.8 LBS | DIASTOLIC BLOOD PRESSURE: 80 MMHG

## 2021-06-16 DIAGNOSIS — J98.01 BRONCHOSPASM: Primary | ICD-10-CM

## 2021-06-16 DIAGNOSIS — J45.909 UNCOMPLICATED ASTHMA, UNSPECIFIED ASTHMA SEVERITY, UNSPECIFIED WHETHER PERSISTENT: ICD-10-CM

## 2021-06-16 PROCEDURE — 99214 OFFICE O/P EST MOD 30 MIN: CPT | Performed by: INTERNAL MEDICINE

## 2021-06-16 RX ORDER — ALBUTEROL SULFATE 90 UG/1
2 AEROSOL, METERED RESPIRATORY (INHALATION) EVERY 6 HOURS PRN
Qty: 1 INHALER | Refills: 5 | Status: SHIPPED | OUTPATIENT
Start: 2021-06-16

## 2021-06-16 ASSESSMENT — ENCOUNTER SYMPTOMS: RESPIRATORY NEGATIVE: 1

## 2023-02-19 ENCOUNTER — APPOINTMENT (OUTPATIENT)
Dept: GENERAL RADIOLOGY | Age: 68
End: 2023-02-19
Payer: COMMERCIAL

## 2023-02-19 ENCOUNTER — HOSPITAL ENCOUNTER (EMERGENCY)
Age: 68
Discharge: HOME | End: 2023-02-19
Payer: COMMERCIAL

## 2023-02-19 VITALS
DIASTOLIC BLOOD PRESSURE: 63 MMHG | RESPIRATION RATE: 16 BRPM | BODY MASS INDEX: 28.3 KG/M2 | SYSTOLIC BLOOD PRESSURE: 157 MMHG | TEMPERATURE: 97.6 F | WEIGHT: 180.34 LBS | HEART RATE: 62 BPM | HEIGHT: 67 IN | OXYGEN SATURATION: 97 %

## 2023-02-19 DIAGNOSIS — R03.0 ELEVATED BLOOD PRESSURE READING IN OFFICE WITHOUT DIAGNOSIS OF HYPERTENSION: ICD-10-CM

## 2023-02-19 DIAGNOSIS — R93.89 ABNORMAL FINDING ON CHEST XRAY: Primary | ICD-10-CM

## 2023-02-19 LAB
A/G RATIO: 1.3 (ref 1.1–2.2)
ALBUMIN SERPL-MCNC: 4.1 G/DL (ref 3.4–5)
ALP BLD-CCNC: 126 U/L (ref 40–129)
ALT SERPL-CCNC: 12 U/L (ref 10–40)
ANION GAP SERPL CALCULATED.3IONS-SCNC: 9 MMOL/L (ref 3–16)
AST SERPL-CCNC: 17 U/L (ref 15–37)
BASOPHILS ABSOLUTE: 0.1 K/UL (ref 0–0.2)
BASOPHILS RELATIVE PERCENT: 0.8 %
BILIRUB SERPL-MCNC: 0.4 MG/DL (ref 0–1)
BUN BLDV-MCNC: 11 MG/DL (ref 7–20)
CALCIUM SERPL-MCNC: 9.1 MG/DL (ref 8.3–10.6)
CHLORIDE BLD-SCNC: 99 MMOL/L (ref 99–110)
CO2: 25 MMOL/L (ref 21–32)
CREAT SERPL-MCNC: 0.7 MG/DL (ref 0.8–1.3)
EOSINOPHILS ABSOLUTE: 0.4 K/UL (ref 0–0.6)
EOSINOPHILS RELATIVE PERCENT: 5.6 %
GFR SERPL CREATININE-BSD FRML MDRD: >60 ML/MIN/{1.73_M2}
GLUCOSE BLD-MCNC: 101 MG/DL (ref 70–99)
HCT VFR BLD CALC: 41.8 % (ref 40.5–52.5)
HEMOGLOBIN: 13.7 G/DL (ref 13.5–17.5)
LYMPHOCYTES ABSOLUTE: 1.9 K/UL (ref 1–5.1)
LYMPHOCYTES RELATIVE PERCENT: 25.3 %
MCH RBC QN AUTO: 27.6 PG (ref 26–34)
MCHC RBC AUTO-ENTMCNC: 32.8 G/DL (ref 31–36)
MCV RBC AUTO: 84.4 FL (ref 80–100)
MONOCYTES ABSOLUTE: 0.5 K/UL (ref 0–1.3)
MONOCYTES RELATIVE PERCENT: 6.2 %
NEUTROPHILS ABSOLUTE: 4.7 K/UL (ref 1.7–7.7)
NEUTROPHILS RELATIVE PERCENT: 62.1 %
PDW BLD-RTO: 15.4 % (ref 12.4–15.4)
PLATELET # BLD: 249 K/UL (ref 135–450)
PMV BLD AUTO: 7.8 FL (ref 5–10.5)
POTASSIUM REFLEX MAGNESIUM: 4.1 MMOL/L (ref 3.5–5.1)
PRO-BNP: 26 PG/ML (ref 0–124)
RAPID INFLUENZA  B AGN: NEGATIVE
RAPID INFLUENZA A AGN: NEGATIVE
RBC # BLD: 4.95 M/UL (ref 4.2–5.9)
SARS-COV-2, NAAT: NOT DETECTED
SODIUM BLD-SCNC: 133 MMOL/L (ref 136–145)
TOTAL PROTEIN: 7.3 G/DL (ref 6.4–8.2)
TROPONIN: <0.01 NG/ML
WBC # BLD: 7.6 K/UL (ref 4–11)

## 2023-02-19 PROCEDURE — 93005 ELECTROCARDIOGRAM TRACING: CPT | Performed by: NURSE PRACTITIONER

## 2023-02-19 PROCEDURE — 6370000000 HC RX 637 (ALT 250 FOR IP): Performed by: NURSE PRACTITIONER

## 2023-02-19 PROCEDURE — 83880 ASSAY OF NATRIURETIC PEPTIDE: CPT

## 2023-02-19 PROCEDURE — 87804 INFLUENZA ASSAY W/OPTIC: CPT

## 2023-02-19 PROCEDURE — 71046 X-RAY EXAM CHEST 2 VIEWS: CPT

## 2023-02-19 PROCEDURE — 36415 COLL VENOUS BLD VENIPUNCTURE: CPT

## 2023-02-19 PROCEDURE — 80053 COMPREHEN METABOLIC PANEL: CPT

## 2023-02-19 PROCEDURE — 87635 SARS-COV-2 COVID-19 AMP PRB: CPT

## 2023-02-19 PROCEDURE — 85025 COMPLETE CBC W/AUTO DIFF WBC: CPT

## 2023-02-19 PROCEDURE — 84484 ASSAY OF TROPONIN QUANT: CPT

## 2023-02-19 PROCEDURE — 99285 EMERGENCY DEPT VISIT HI MDM: CPT

## 2023-02-19 RX ORDER — IBUPROFEN 600 MG/1
600 TABLET ORAL 3 TIMES DAILY PRN
Qty: 15 TABLET | Refills: 0 | Status: SHIPPED | OUTPATIENT
Start: 2023-02-19 | End: 2023-02-24

## 2023-02-19 RX ORDER — AZITHROMYCIN 250 MG/1
TABLET, FILM COATED ORAL
Qty: 1 PACKET | Refills: 0 | Status: SHIPPED | OUTPATIENT
Start: 2023-02-19 | End: 2023-03-01

## 2023-02-19 RX ORDER — AZITHROMYCIN 500 MG/1
500 TABLET, FILM COATED ORAL ONCE
Status: COMPLETED | OUTPATIENT
Start: 2023-02-19 | End: 2023-02-19

## 2023-02-19 RX ORDER — ACETAMINOPHEN 500 MG
500 TABLET ORAL 4 TIMES DAILY PRN
Qty: 28 TABLET | Refills: 0 | Status: SHIPPED | OUTPATIENT
Start: 2023-02-19 | End: 2023-02-26

## 2023-02-19 RX ORDER — IBUPROFEN 600 MG/1
600 TABLET ORAL 3 TIMES DAILY PRN
Qty: 15 TABLET | Refills: 0 | Status: SHIPPED | OUTPATIENT
Start: 2023-02-19 | End: 2023-02-19 | Stop reason: SDUPTHER

## 2023-02-19 RX ADMIN — AZITHROMYCIN MONOHYDRATE 500 MG: 500 TABLET ORAL at 12:19

## 2023-02-19 ASSESSMENT — PAIN - FUNCTIONAL ASSESSMENT
PAIN_FUNCTIONAL_ASSESSMENT: 0-10
PAIN_FUNCTIONAL_ASSESSMENT: NONE - DENIES PAIN

## 2023-02-19 ASSESSMENT — PAIN SCALES - GENERAL: PAINLEVEL_OUTOF10: 0

## 2023-02-19 NOTE — DISCHARGE INSTRUCTIONS
Return to the emergency department for new or worsening symptoms including, not limited to, developing chest pain, severe shortness of breath, passing out, feeling as if you are going to pass out, fevers not relieved by medications, inability to tolerate food or drink, or other symptoms/concerns. Follow-up with your PCP for reevaluation in the next 2-3 days. Medication as prescribed ensuring that you eat prior to taking ibuprofen as this is an NSAID medication that can otherwise cause gastrointestinal bleeding/stomach ulcers if taken on empty stomach. Also ensure that you complete the full course of the antibiotic called azithromycin without missed doses. Do not drink alcohol with the antibiotic. Get plenty of rest.    Plenty of fluids unless you are on fluid restrictions and if you are restrictions please adhere to those limits.

## 2023-02-19 NOTE — ED NOTES
D/C: Order noted for d/c. Pt confirmed d/c paperwork have correct name. Discharge and education instructions reviewed with patient. Teach-back successful. Pt verbalized understanding and signed d/c papers. Pt denied questions at this time. No acute distress noted. Patient instructed to follow-up as noted - return to emergency department if symptoms worsen. Patient verbalized understanding. Discharged per EDMD with discharge instructions. Pt discharged to private vehicle. Patient stable upon departure. Thanked patient for choosing [facility]) for care. Provider aware of patient pain at time of discharge.        Jayda Alexandre RN  02/19/23 [id number]

## 2023-02-20 ASSESSMENT — ENCOUNTER SYMPTOMS
ANAL BLEEDING: 0
SORE THROAT: 0
VOMITING: 0
COUGH: 1
WHEEZING: 0
NAUSEA: 0
BACK PAIN: 0
EYE PAIN: 0
ABDOMINAL PAIN: 0
SHORTNESS OF BREATH: 1

## 2023-02-20 NOTE — ED PROVIDER NOTES
[address] [facility]        Pt Name: Carolina Rodriguez  MRN: [id number]  [place] 1955  Date of evaluation: 2/19/2023  Provider: TC Raya - LILI  PCP: Bassam Post MD  Note Started: 1:25 PM EST 2/20/23      BISHOP. I have evaluated this patient. My supervising physician was available for consultation. CHIEF COMPLAINT       Chief Complaint   Patient presents with    Shortness of Breath     For a couple weeks, along with a cough        HISTORY OF PRESENT ILLNESS: 1 or more Elements     History from : Patient    Limitations to history : None    Carolina Rodriguez is a 75 y.o. nontoxic and well-appearing male in no obvious distress who presents to the emergency department status post he was told by his PCP 2 days ago to go to the emergency department status post he has been experiencing a cough with shortness of breath for approximately 2 weeks. Denies chest pain, nausea, vomiting, lightheadedness, dizziness, fever, chills, sweats, change in ability to smell/taste, hemoptysis, leg/calf pain or swelling, headaches, body aches, abdominal pain, diarrhea, other symptoms/concerns. He endorses the cough is intermittently productive of yellowish phlegm and presyncope with cough. Nursing Notes were all reviewed and agreed with or any disagreements were addressed in the HPI. REVIEW OF SYSTEMS :      Review of Systems   Constitutional:  Negative for chills, diaphoresis, fatigue and fever. HENT:  Negative for congestion and sore throat. Eyes:  Negative for pain and visual disturbance. Respiratory:  Positive for cough and shortness of breath. Negative for wheezing. Cardiovascular:  Negative for chest pain and leg swelling. Gastrointestinal:  Negative for abdominal pain, anal bleeding, nausea and vomiting. Genitourinary:  Negative for difficulty urinating, dysuria, frequency and urgency.    Musculoskeletal:  Negative for back pain and neck pain. Skin:  Negative for rash and wound. Neurological:  Negative for dizziness, syncope (+ Presyncope) and light-headedness. Positives and Pertinent negatives as per HPI. SURGICAL HISTORY     Past Surgical History:   Procedure Laterality Date    ROTATOR CUFF REPAIR         CURRENTMEDICATIONS       Discharge Medication List as of 2023 12:23 PM        CONTINUE these medications which have NOT CHANGED    Details   albuterol sulfate  (90 Base) MCG/ACT inhaler Inhale 2 puffs into the lungs every 6 hours as needed for Wheezing or Shortness of Breath, Disp-1 Inhaler, R-5Normal      meloxicam (MOBIC) 7.5 MG tablet Take 1 tablet by mouth daily, Disp-30 tablet, R-1Normal      aspirin 81 MG EC tablet Take 1 tablet by mouth daily, Disp-30 tablet, R-3Print      atorvastatin (LIPITOR) 40 MG tablet Take 1 tablet by mouth nightly, Disp-30 tablet, R-3Print      Ranitidine HCl (ZANTAC PO) Take by mouth as needed Indications: for refluxHistorical Med             ALLERGIES     Patient has no known allergies. FAMILYHISTORY       Family History   Problem Relation Age of Onset    Cancer Mother     Heart Disease Father     High Cholesterol Father         SOCIAL HISTORY       Social History     Tobacco Use    Smoking status: Former     Types: Cigarettes     Quit date: 3/3/1981     Years since quittin.9    Smokeless tobacco: Never   Vaping Use    Vaping Use: Never used   Substance Use Topics    Alcohol use:  Yes     Alcohol/week: 2.0 standard drinks     Types: 2 Cans of beer per week    Drug use: No       SCREENINGS        [place] Coma Scale  Eye Opening: Spontaneous  Best Verbal Response: Oriented  Best Motor Response: Obeys commands  [place] Coma Scale Score: 15                CIWA Assessment  BP: (!) 157/63  Heart Rate: 62           PHYSICAL EXAM  1 or more Elements     ED Triage Vitals [23 0919]   BP Temp Temp Source Heart Rate Resp SpO2 Height Weight   (!) 159/91 97.6 °F (36.4 °C) Oral 72 17 96 % 5' 7\" (1.702 m) 180 lb 5.4 oz (81.8 kg)       Physical Exam  Vitals and nursing note reviewed. Constitutional:       General: He is not in acute distress. Appearance: Normal appearance. He is not ill-appearing, toxic-appearing or diaphoretic. HENT:      Head: Normocephalic and atraumatic. Right Ear: External ear normal.      Left Ear: External ear normal.      Nose: Nose normal.      Mouth/Throat:      Mouth: Mucous membranes are moist.   Eyes:      General:         Right eye: No discharge. Left eye: No discharge. Extraocular Movements: Extraocular movements intact. Cardiovascular:      Rate and Rhythm: Normal rate and regular rhythm. Heart sounds: No murmur heard. No friction rub. No gallop. Pulmonary:      Effort: Pulmonary effort is normal. No respiratory distress. Breath sounds: No decreased breath sounds, wheezing, rhonchi or rales. Musculoskeletal:         General: Normal range of motion. Cervical back: Normal range of motion and neck supple. Skin:     General: Skin is warm and dry. Capillary Refill: Capillary refill takes less than 2 seconds. Neurological:      Mental Status: He is alert and oriented to person, place, and time.    Psychiatric:         Mood and Affect: Mood normal.         Behavior: Behavior normal.       DIAGNOSTIC RESULTS   LABS:    I have reviewed and interpreted all of the currently available lab results from this visit:  Results for orders placed or performed during the hospital encounter of 02/19/23   COVID-19, Rapid    Specimen: Nasopharyngeal Swab   Result Value Ref Range    SARS-CoV-2, NAAT Not Detected Not Detected   Rapid influenza A/B antigens    Specimen: Nasopharyngeal   Result Value Ref Range    Rapid Influenza A Ag Negative Negative    Rapid Influenza B Ag Negative Negative   CBC with Auto Differential   Result Value Ref Range    WBC 7.6 4.0 - 11.0 K/uL    RBC 4.95 4.20 - 5.90 M/uL    Hemoglobin 13.7 13.5 - 17.5 g/dL    Hematocrit 41.8 40.5 - 52.5 %    MCV 84.4 80.0 - 100.0 fL    MCH 27.6 26.0 - 34.0 pg    MCHC 32.8 31.0 - 36.0 g/dL    RDW 15.4 12.4 - 15.4 %    Platelets 521 931 - 119 K/uL    MPV 7.8 5.0 - 10.5 fL    Neutrophils % 62.1 %    Lymphocytes % 25.3 %    Monocytes % 6.2 %    Eosinophils % 5.6 %    Basophils % 0.8 %    Neutrophils Absolute 4.7 1.7 - 7.7 K/uL    Lymphocytes Absolute 1.9 1.0 - 5.1 K/uL    Monocytes Absolute 0.5 0.0 - 1.3 K/uL    Eosinophils Absolute 0.4 0.0 - 0.6 K/uL    Basophils Absolute 0.1 0.0 - 0.2 K/uL   Comprehensive Metabolic Panel w/ Reflex to MG   Result Value Ref Range    Sodium 133 (L) 136 - 145 mmol/L    Potassium reflex Magnesium 4.1 3.5 - 5.1 mmol/L    Chloride 99 99 - 110 mmol/L    CO2 25 21 - 32 mmol/L    Anion Gap 9 3 - 16    Glucose 101 (H) 70 - 99 mg/dL    BUN 11 7 - 20 mg/dL    Creatinine 0.7 (L) 0.8 - 1.3 mg/dL    Est, Glom Filt Rate >60 >60    Calcium 9.1 8.3 - 10.6 mg/dL    Total Protein 7.3 6.4 - 8.2 g/dL    Albumin 4.1 3.4 - 5.0 g/dL    Albumin/Globulin Ratio 1.3 1.1 - 2.2    Total Bilirubin 0.4 0.0 - 1.0 mg/dL    Alkaline Phosphatase 126 40 - 129 U/L    ALT 12 10 - 40 U/L    AST 17 15 - 37 U/L   Troponin   Result Value Ref Range    Troponin <0.01 <0.01 ng/mL   Brain Natriuretic Peptide   Result Value Ref Range    Pro-BNP 26 0 - 124 pg/mL   EKG 12 Lead   Result Value Ref Range    Ventricular Rate 61 BPM    Atrial Rate 61 BPM    P-R Interval 164 ms    QRS Duration 88 ms    Q-T Interval 384 ms    QTc Calculation (Bazett) 386 ms    P Axis 58 degrees    R Axis 0 degrees    T Axis 34 degrees    Diagnosis       Normal sinus rhythmNormal ECGWhen compared with ECG of 23-MAY-2021 14:05,No significant change was found         When ordered only abnormal lab results are displayed. All other labs were within normal range or not returned as of this dictation. EKG:  When ordered, EKG's are interpreted by the Emergency Department Physician in the absence of a cardiologist.  Please see their note for interpretation of EKG. RADIOLOGY:   Non-plain film images such as CT, Ultrasound and MRI are read by the radiologist. Plain radiographic images are visualized and preliminarily interpreted by the ED Provider with the below findings:      Interpretation per the Radiologist below, if available at the time of this note:    XR CHEST (2 VW)   Final Result   Suspect small right-sided pleural effusion with adjacent right lower lobe   airspace disease, either atelectasis or pneumonia      Scattered calcified granuloma           XR CHEST (2 VW)    Result Date: 2/19/2023  EXAMINATION: TWO XRAY VIEWS OF THE CHEST 2/19/2023 9:23 am COMPARISON: May 2021 HISTORY: ORDERING SYSTEM PROVIDED HISTORY: cough,sob TECHNOLOGIST PROVIDED HISTORY: Reason for exam:->cough,sob Reason for Exam: cough and SOB for a couple of weeks FINDINGS: Heart size and mediastinal contours are stable. Scattered calcified granuloma seen. No focal consolidation noted on the left. There is blunting of the right costophrenic angle with adjacent right basilar opacity. Suspect small right-sided pleural effusion with adjacent right lower lobe airspace disease, either atelectasis or pneumonia Scattered calcified granuloma           PROCEDURES   Unless otherwise noted below, none     Procedures    CRITICAL CARE TIME (.cctime)   0 minutes    PAST MEDICAL HISTORY     Mr. Chante Ariza has a past medical history of GERD (gastroesophageal reflux disease), Hyperlipidemia, Movement disorder, and Uncomplicated asthma (0/22/0724).      Chronic Conditions affecting Care: Uncomplicated asthma    EMERGENCY DEPARTMENT COURSE and DIFFERENTIAL DIAGNOSIS/MDM:   Vitals:    Vitals:    02/19/23 0919 02/19/23 1215   BP: (!) 159/91 (!) 157/63   Pulse: 72 62   Resp: 17 16   Temp: 97.6 °F (36.4 °C)    TempSrc: Oral    SpO2: 96% 97%   Weight: 180 lb 5.4 oz (81.8 kg)    Height: 5' 7\" (1.702 m)    Alternate diagnoses were considered less likely based on history and physical.  Considered acute coronary syndrome, pulmonary embolism, COPD/asthma, pneumonia, sepsis, pericardial tamponade, pneumothorax, CHF, thoracic aortic dissection, anxiety, COVID-19, influenza,          Benton Goodpasture is a 75 y.o. nontoxic and well-appearing male in no obvious distress who presents to the emergency department status post he was told by his PCP 2 days ago to go to the emergency department status post he has been experiencing a cough with shortness of breath for approximately 2 weeks. Denies chest pain, nausea, vomiting, lightheadedness, dizziness, fever, chills, sweats, change in ability to smell/taste, hemoptysis, leg/calf pain or swelling, headaches, body aches, abdominal pain, diarrhea, other symptoms/concerns. He endorses the cough is intermittently productive of yellowish phlegm and presyncope with cough. I will obtain baseline cardiac work-up including CBC, CMP, troponin, BNP, COVID-19, rapid influenza, CXR, and EKG. Work-up pending. Patient medicated as below. Patient was given the following medications:  Medications   azithromycin (ZITHROMAX) tablet 500 mg (500 mg Oral Given 2/19/23 1219)     Work-up reveals:  CBC: No leukocytosis or anemia, unremarkable  Metabolic panel: Mild hyponatremia 133, hyperglycemic mildly at 101 mg/dL, no renal dysfunction or elevation LFTs  Troponin: <0.01  BMP: [email or web address]  COVID-19: Not detected  Rapid influenza A/B: Negative  CXR: As noted above identifying small right-sided pleural effusion with adjacent right lower lobe airspace disease, either atelectasis or pneumonia. Is this patient to be included in the SEP-1 Core Measure due to severe sepsis or septic shock?    No   Exclusion criteria - the patient is NOT to be included for SEP-1 Core Measure due to:  2+ SIRS criteria are not met    CONSULTS: (Who and What was discussed)  None  Discussion with Other Profesionals : None    Social Determinants : None    Records Reviewed : None    CC/HPI Summary, DDx, ED Course, and Reassessment: On reassessment the patient is resting comfortably on stretcher with eyes open with stable vital signs. His wife is now at his bedside and I had the opportunity to meet Mrs. Adam Garcia. Discussed the small right pleural effusion and possible pneumonia. Given that the patient has a history of asthma, has shortness of breath, and his cough has been ongoing for 2 weeks and is now productive of yellowish phlegm will cover him for the potential pneumonia identified on CXR. First dose of antibiotic given here in the emergency department. Patient will follow-up on an outpatient basis and will receive prescriptions as noted below. Strict return precautions. Patient verbalized understanding and is agreeable with the plan for discharge and follow-up. Disposition Considerations (include 1 Tests not done, Shared Decision Making, Pt Expectation of Test or Tx.):   Discharge with outpatient follow-up      I am the Primary Clinician of Record. FINAL IMPRESSION      1. Abnormal finding on chest xray    2.  Elevated blood pressure reading in office without diagnosis of hypertension          DISPOSITION/PLAN     DISPOSITION Decision to Discharge    PATIENT REFERRED TO:  Ramona Nguyen MD  [address] [place] [id number] [phone]    Call in 1 day      [facility] Emergency Department  [address] [facility]  [phone]  Go to   As needed      DISCHARGE MEDICATIONS:     Medication List        START taking these medications      acetaminophen 500 MG tablet  Commonly known as: TYLENOL  Take 1 tablet by mouth 4 times daily as needed for Pain     azithromycin 250 MG tablet  Commonly known as: Zithromax  Take 2 tablets (500 mg) on Day 1, followed by 1 tablet (250 mg) once daily on Days 2 through 5.     ibuprofen 600 MG tablet  Commonly known as: ADVIL;MOTRIN  Take 1 tablet by mouth 3 times daily as needed for Pain With meals ASK your doctor about these medications      albuterol sulfate  (90 Base) MCG/ACT inhaler  Commonly known as: PROVENTIL;VENTOLIN;PROAIR  Inhale 2 puffs into the lungs every 6 hours as needed for Wheezing or Shortness of Breath     aspirin 81 MG EC tablet  Take 1 tablet by mouth daily     atorvastatin 40 MG tablet  Commonly known as: LIPITOR  Take 1 tablet by mouth nightly     meloxicam 7.5 MG tablet  Commonly known as: MOBIC  Take 1 tablet by mouth daily     ZANTAC PO               Where to Get Your Medications        You can get these medications from any pharmacy    Bring a paper prescription for each of these medications  acetaminophen 500 MG tablet  azithromycin 250 MG tablet  ibuprofen 600 MG tablet             DISCONTINUED MEDICATIONS:  Discharge Medication List as of 2/19/2023 12:23 PM                 (Please note that portions of this note were completed with a voice recognition program.  Efforts were made to edit the dictations but occasionally words are mis-transcribed.)    TC Polanco CNP (electronically signed)            TC Polanco CNP  02/20/23 [id number]

## 2023-02-21 LAB
EKG ATRIAL RATE: 61 BPM
EKG DIAGNOSIS: NORMAL
EKG P AXIS: 58 DEGREES
EKG P-R INTERVAL: 164 MS
EKG Q-T INTERVAL: 384 MS
EKG QRS DURATION: 88 MS
EKG QTC CALCULATION (BAZETT): 386 MS
EKG R AXIS: 0 DEGREES
EKG T AXIS: 34 DEGREES
EKG VENTRICULAR RATE: 61 BPM

## 2023-02-21 PROCEDURE — 93010 ELECTROCARDIOGRAM REPORT: CPT | Performed by: INTERNAL MEDICINE

## 2023-09-11 ENCOUNTER — HOSPITAL ENCOUNTER (EMERGENCY)
Age: 68
Discharge: HOME | End: 2023-09-11
Payer: COMMERCIAL

## 2023-09-11 VITALS
TEMPERATURE: 99 F | OXYGEN SATURATION: 98 % | BODY MASS INDEX: 29.2 KG/M2 | HEART RATE: 78 BPM | SYSTOLIC BLOOD PRESSURE: 138 MMHG | WEIGHT: 181.66 LBS | RESPIRATION RATE: 17 BRPM | HEIGHT: 66 IN | DIASTOLIC BLOOD PRESSURE: 74 MMHG

## 2023-09-11 DIAGNOSIS — M54.41 ACUTE RIGHT-SIDED LOW BACK PAIN WITH RIGHT-SIDED SCIATICA: Primary | ICD-10-CM

## 2023-09-11 PROCEDURE — 99283 EMERGENCY DEPT VISIT LOW MDM: CPT

## 2023-09-11 RX ORDER — ACETAMINOPHEN 500 MG
1000 TABLET ORAL EVERY 8 HOURS PRN
Qty: 60 TABLET | Refills: 0 | Status: SHIPPED | OUTPATIENT
Start: 2023-09-11 | End: 2023-09-21

## 2023-09-11 RX ORDER — METHYLPREDNISOLONE 4 MG/1
TABLET ORAL
Qty: 1 KIT | Refills: 0 | Status: SHIPPED | OUTPATIENT
Start: 2023-09-11 | End: 2023-09-17

## 2023-09-11 RX ORDER — METHOCARBAMOL 500 MG/1
500 TABLET, FILM COATED ORAL 3 TIMES DAILY
Qty: 30 TABLET | Refills: 0 | Status: SHIPPED | OUTPATIENT
Start: 2023-09-11 | End: 2023-09-21

## 2023-09-11 NOTE — ED TRIAGE NOTES
Pt into ER from home with c/c Lower back pain, onset Wednesday when lifting heavy object at work. Pt with pain shooting down right leg. Pt is able to walk. Pt denies any urinary symptoms.

## 2023-09-11 NOTE — ED PROVIDER NOTES
[address] [facility] [address] [address]        Pt Name: Dwayne Sapp  MRN: [id number]  [address] [facility] 1955  Date of evaluation: 9/11/2023  Provider: Eveline Albright PA-C  PCP: Negrita Aly MD  Note Started: 11:31 AM EDT 9/11/23      BISHOP. I have evaluated this patient. CHIEF COMPLAINT       Chief Complaint   Patient presents with    Back Pain     Lower back pain, onset Wednesday when lifting heavy object at work. Pt with pain shooting down right leg. Pt is able to walk. Pt denies any urinary symptoms. HISTORY OF PRESENT ILLNESS: 1 or more Elements             Dwayne Sapp is a 70 y.o. male who presents to the emergency department with complaint of right-sided back pain that started several days ago. States that he was lifting a heavy object at work when he felt the pain. Describes it as a sharp pain that radiates down his leg. At times it is achy. It gets worse when he lays down to go to bed. It is better when he walks. He has been taking over-the-counter medication for this, however not significantly improving his symptoms. States that he has had something similar in the past but it was on the other side. He denies any urinary incontinence, bowel incontinence, saddle anesthesia, IV drug use. Nursing Notes were all reviewed and agreed with or any disagreements were addressed in the HPI. REVIEW OF SYSTEMS :      Review of Systems   All other systems reviewed and are negative. Positives and Pertinent negatives as per HPI.      SURGICAL HISTORY     Past Surgical History:   Procedure Laterality Date    ROTATOR CUFF REPAIR         CURRENTMEDICATIONS       Discharge Medication List as of 9/11/2023 10:44 AM        CONTINUE these medications which have NOT CHANGED    Details   ibuprofen (ADVIL;MOTRIN) 600 MG tablet Take 1 tablet by mouth 3 times daily as needed for Pain With meals, Disp-15 tablet, R-0Print      albuterol sulfate

## 2023-09-22 ENCOUNTER — HOSPITAL ENCOUNTER (EMERGENCY)
Age: 68
Discharge: HOME | End: 2023-09-22
Payer: COMMERCIAL

## 2023-09-22 ENCOUNTER — APPOINTMENT (OUTPATIENT)
Dept: GENERAL RADIOLOGY | Age: 68
End: 2023-09-22
Payer: COMMERCIAL

## 2023-09-22 VITALS
TEMPERATURE: 98 F | OXYGEN SATURATION: 97 % | SYSTOLIC BLOOD PRESSURE: 171 MMHG | RESPIRATION RATE: 16 BRPM | BODY MASS INDEX: 29.05 KG/M2 | DIASTOLIC BLOOD PRESSURE: 82 MMHG | HEART RATE: 77 BPM | WEIGHT: 180 LBS

## 2023-09-22 DIAGNOSIS — M54.40 BACK PAIN OF LUMBAR REGION WITH SCIATICA: Primary | ICD-10-CM

## 2023-09-22 DIAGNOSIS — R93.89 ABNORMAL X-RAY: ICD-10-CM

## 2023-09-22 PROCEDURE — 99283 EMERGENCY DEPT VISIT LOW MDM: CPT

## 2023-09-22 PROCEDURE — 72100 X-RAY EXAM L-S SPINE 2/3 VWS: CPT

## 2023-09-22 PROCEDURE — 6370000000 HC RX 637 (ALT 250 FOR IP): Performed by: NURSE PRACTITIONER

## 2023-09-22 RX ORDER — ACETAMINOPHEN 500 MG
500 TABLET ORAL 4 TIMES DAILY PRN
Qty: 28 TABLET | Refills: 0 | Status: SHIPPED | OUTPATIENT
Start: 2023-09-22 | End: 2023-09-29

## 2023-09-22 RX ORDER — ACETAMINOPHEN 325 MG/1
650 TABLET ORAL ONCE
Status: COMPLETED | OUTPATIENT
Start: 2023-09-22 | End: 2023-09-22

## 2023-09-22 RX ORDER — LIDOCAINE 4 G/G
1 PATCH TOPICAL ONCE
Status: DISCONTINUED | OUTPATIENT
Start: 2023-09-22 | End: 2023-09-22 | Stop reason: HOSPADM

## 2023-09-22 RX ORDER — PREDNISONE 20 MG/1
60 TABLET ORAL ONCE
Status: COMPLETED | OUTPATIENT
Start: 2023-09-22 | End: 2023-09-22

## 2023-09-22 RX ORDER — LIDOCAINE 50 MG/G
1 PATCH TOPICAL DAILY
Qty: 10 PATCH | Refills: 0 | Status: SHIPPED | OUTPATIENT
Start: 2023-09-22 | End: 2023-10-02

## 2023-09-22 RX ORDER — PREDNISONE 10 MG/1
60 TABLET ORAL DAILY
Qty: 30 TABLET | Refills: 0 | Status: SHIPPED | OUTPATIENT
Start: 2023-09-22 | End: 2023-09-27

## 2023-09-22 RX ORDER — GABAPENTIN 100 MG/1
100 CAPSULE ORAL 2 TIMES DAILY
Qty: 14 CAPSULE | Refills: 0 | Status: SHIPPED | OUTPATIENT
Start: 2023-09-22 | End: 2023-09-29

## 2023-09-22 RX ORDER — GABAPENTIN 100 MG/1
200 CAPSULE ORAL ONCE
Status: COMPLETED | OUTPATIENT
Start: 2023-09-22 | End: 2023-09-22

## 2023-09-22 RX ADMIN — ACETAMINOPHEN 650 MG: 325 TABLET ORAL at 16:28

## 2023-09-22 RX ADMIN — PREDNISONE 60 MG: 20 TABLET ORAL at 16:28

## 2023-09-22 RX ADMIN — GABAPENTIN 200 MG: 100 CAPSULE ORAL at 16:28

## 2023-09-22 ASSESSMENT — PAIN SCALES - GENERAL
PAINLEVEL_OUTOF10: 6
PAINLEVEL_OUTOF10: 9
PAINLEVEL_OUTOF10: 6

## 2023-09-22 ASSESSMENT — PAIN DESCRIPTION - ORIENTATION: ORIENTATION: RIGHT

## 2023-09-22 ASSESSMENT — PAIN - FUNCTIONAL ASSESSMENT
PAIN_FUNCTIONAL_ASSESSMENT: 0-10
PAIN_FUNCTIONAL_ASSESSMENT: 0-10

## 2023-09-22 ASSESSMENT — PAIN DESCRIPTION - LOCATION: LOCATION: LEG

## 2023-09-22 ASSESSMENT — PAIN DESCRIPTION - DESCRIPTORS: DESCRIPTORS: PATIENT UNABLE TO DESCRIBE

## 2023-09-22 NOTE — DISCHARGE INSTRUCTIONS
Return to the emergency department for new or worsening symptoms including, but not limited to, developing numbness or tingling between your legs or your backside, loss of bowel or bladder control, inability to urinate, profound weakness in your legs caused you to be unable to walk, or other symptoms/concerns. Medications as prescribed. Continue the skeletal muscle relaxant-Robaxin as previously prescribed remembering to not drink, drive, operate heavy machinery, or sign important/legal documents while taking this medication unless she can dedicate at least 8 hours to sleep. Follow-up with your primary care provider for reevaluation next 1-2 days and that the spine specialist- Symptoms worsen or fail to improve in the next 1 week.

## 2023-09-24 NOTE — ED PROVIDER NOTES
days, Disp-14 capsule, R-0Normal      lidocaine (LIDODERM) 5 % Place 1 patch onto the skin daily for 10 days 12 hours on, 12 hours off., Disp-10 patch, R-0Normal      predniSONE (DELTASONE) 10 MG tablet Take 6 tablets by mouth daily for 5 doses, Disp-30 tablet, R-0Normal             DISCONTINUED MEDICATIONS:  Discharge Medication List as of 9/22/2023  5:59 PM                 (Please note that portions of this note were completed with a voice recognition program.  Efforts were made to edit the dictations but occasionally words are mis-transcribed.)    TC Trejo CNP (electronically signed)            TC Trejo CNP  09/27/23 [id number]

## 2023-09-27 ASSESSMENT — ENCOUNTER SYMPTOMS
NAUSEA: 1
COUGH: 0
ANAL BLEEDING: 0
EYE PAIN: 0
DIARRHEA: 0
VOMITING: 0
SHORTNESS OF BREATH: 0
ABDOMINAL PAIN: 0
BACK PAIN: 1
SORE THROAT: 0

## 2023-10-02 ENCOUNTER — HOSPITAL ENCOUNTER (EMERGENCY)
Age: 68
Discharge: HOME | End: 2023-10-02
Payer: COMMERCIAL

## 2023-10-02 VITALS
BODY MASS INDEX: 28.68 KG/M2 | WEIGHT: 177.69 LBS | SYSTOLIC BLOOD PRESSURE: 198 MMHG | OXYGEN SATURATION: 98 % | HEART RATE: 73 BPM | RESPIRATION RATE: 16 BRPM | TEMPERATURE: 97.9 F | DIASTOLIC BLOOD PRESSURE: 81 MMHG

## 2023-10-02 DIAGNOSIS — M54.50 LOW BACK PAIN POTENTIALLY ASSOCIATED WITH RADICULOPATHY: Primary | ICD-10-CM

## 2023-10-02 PROCEDURE — 99283 EMERGENCY DEPT VISIT LOW MDM: CPT

## 2023-10-02 RX ORDER — METHOCARBAMOL 750 MG/1
750 TABLET, FILM COATED ORAL 2 TIMES DAILY
Qty: 20 TABLET | Refills: 0 | Status: SHIPPED | OUTPATIENT
Start: 2023-10-02

## 2023-10-02 RX ORDER — LIDOCAINE 50 MG/G
1 PATCH TOPICAL DAILY
Qty: 20 PATCH | Refills: 0 | Status: SHIPPED | OUTPATIENT
Start: 2023-10-02 | End: 2023-10-22

## 2023-10-02 RX ORDER — IBUPROFEN 800 MG/1
800 TABLET ORAL EVERY 8 HOURS PRN
Qty: 30 TABLET | Refills: 0 | Status: SHIPPED | OUTPATIENT
Start: 2023-10-02

## 2023-10-02 RX ORDER — TRAMADOL HYDROCHLORIDE 50 MG/1
50 TABLET ORAL EVERY 6 HOURS PRN
Qty: 12 TABLET | Refills: 0 | Status: SHIPPED | OUTPATIENT
Start: 2023-10-02 | End: 2023-10-05

## 2023-10-02 ASSESSMENT — PAIN DESCRIPTION - LOCATION: LOCATION: BACK;LEG

## 2023-10-02 ASSESSMENT — PAIN SCALES - GENERAL
PAINLEVEL_OUTOF10: 10
PAINLEVEL_OUTOF10: 10

## 2023-10-02 ASSESSMENT — ENCOUNTER SYMPTOMS
NAUSEA: 0
ABDOMINAL PAIN: 0
SHORTNESS OF BREATH: 0
EYE PAIN: 0
SORE THROAT: 0
VOMITING: 0
BACK PAIN: 1
COUGH: 0

## 2023-10-02 ASSESSMENT — PAIN DESCRIPTION - ORIENTATION: ORIENTATION: LOWER

## 2023-10-02 ASSESSMENT — PAIN DESCRIPTION - DESCRIPTORS: DESCRIPTORS: ACHING

## 2023-10-02 ASSESSMENT — PAIN - FUNCTIONAL ASSESSMENT
PAIN_FUNCTIONAL_ASSESSMENT: 0-10
PAIN_FUNCTIONAL_ASSESSMENT: ACTIVITIES ARE NOT PREVENTED

## 2023-10-02 NOTE — ED PROVIDER NOTES
**ADVANCED PRACTICE PROVIDER, I HAVE EVALUATED THIS PATIENT**        [address] [facility] [address] [address]      Pt Name: Justine Bahena  TZZ:[id number]  [address] [facility] 1955  Date of evaluation: 10/2/2023  Provider: Shane Peace PA-C  Note Started: 7:46 PM EDT 10/2/23        Chief Complaint:    Chief Complaint   Patient presents with    Back Pain     Ongoing back pain that radiating down right leg. Onset of leg pain yesterday. Onset of back pain ~ 2 weeks ago. Nursing Notes, Past Medical Hx, Past Surgical Hx, Social Hx, Allergies, and Family Hx were all reviewed and agreed with or any disagreements were addressed in the HPI.    HPI: (Location, Duration, Timing, Severity, Quality, Assoc Sx, Context, Modifying factors)    History From: Patient  Limitations to history : None    Social Determinants Significantly Affecting Health : None    Chief Complaint of back pain. Started about a week ago and got worse. Pain rating down his right leg started yesterday. Denies numbness or tingling in his feet or fingers. No known injury. Denies loss of bowel or urinary control. No upper back or neck pain. Denies chest pain, no abdominal pain. No extremity pain or weakness. He is ambulatory with a slight limp due to pain. No other complaints. This is a  70 y.o. male who presents to emergency room with the above complaint.     PastMedical/Surgical History:      Diagnosis Date    GERD (gastroesophageal reflux disease)     GERD    Hyperlipidemia     Movement disorder     L Rotator Cuff Repair    Uncomplicated asthma 3/98/9537         Procedure Laterality Date    ROTATOR CUFF REPAIR         Medications:  Previous Medications    ACETAMINOPHEN (TYLENOL) 500 MG TABLET    Take 1 tablet by mouth 4 times daily as needed for Pain    ALBUTEROL SULFATE  (90 BASE) MCG/ACT INHALER    Inhale 2 puffs into the lungs every 6 hours as needed for Wheezing or Shortness of Breath

## 2023-10-02 NOTE — DISCHARGE INSTRUCTIONS
Take prescribed medication as prescribed only  Lidocaine patches only to be worn 12 hours/day. Placed on right side lower back. Follow-up orthopedics if worsens or no improvement. Otherwise, follow-up to primary care provider. Return emergency room for any neurological changes such as extremity weakness, loss of bowel or urinary control, worsening pain.

## 2023-10-24 ENCOUNTER — OFFICE VISIT (OUTPATIENT)
Dept: ORTHOPEDIC SURGERY | Age: 68
End: 2023-10-24

## 2023-10-24 VITALS — HEIGHT: 66 IN | WEIGHT: 180 LBS | BODY MASS INDEX: 28.93 KG/M2

## 2023-10-24 DIAGNOSIS — S39.012A LUMBAR STRAIN, INITIAL ENCOUNTER: Primary | ICD-10-CM

## 2023-10-24 RX ORDER — GABAPENTIN 300 MG/1
300 CAPSULE ORAL 4 TIMES DAILY
Qty: 120 CAPSULE | Refills: 0 | Status: SHIPPED | OUTPATIENT
Start: 2023-10-24 | End: 2023-11-23

## 2023-10-24 NOTE — PROGRESS NOTES
New Patient: LUMBAR SPINE    Referring Provider:  No ref. provider found    CHIEF COMPLAINT:    Chief Complaint   Patient presents with    Lower Back Pain     Low back pain following injury on 9/6/23 when he was pivoting at work installing a door to a cabinet. HISTORY OF PRESENT ILLNESS:    Mr. Rodríguez Agosto  is a pleasant 70 y.o. male presents today for evaluation of low back and right leg pain that began 6 weeks ago while installing a cabinet with his pain 8/10. He notes numbness and tingling in the right leg. .. He denies saddle anesthesia and bowel or bladder dysfunction. Current/Past Treatment:   Physical Therapy: no  Chiropractic:  no   Injection:  no   Medications:  oral steroids and motrin     Past Medical History:   Past Medical History:   Diagnosis Date    GERD (gastroesophageal reflux disease)     GERD    Hyperlipidemia     Movement disorder     L Rotator Cuff Repair    Uncomplicated asthma 1/82/8099      Past Surgical History:     Past Surgical History:   Procedure Laterality Date    ROTATOR CUFF REPAIR       Current Medications:     Current Outpatient Medications:     ibuprofen (ADVIL;MOTRIN) 800 MG tablet, Take 1 tablet by mouth every 8 hours as needed for Pain, Disp: 30 tablet, Rfl: 0    albuterol sulfate  (90 Base) MCG/ACT inhaler, Inhale 2 puffs into the lungs every 6 hours as needed for Wheezing or Shortness of Breath, Disp: 1 Inhaler, Rfl: 5    aspirin 81 MG EC tablet, Take 1 tablet by mouth daily, Disp: 30 tablet, Rfl: 3    atorvastatin (LIPITOR) 40 MG tablet, Take 1 tablet by mouth nightly, Disp: 30 tablet, Rfl: 3    Ranitidine HCl (ZANTAC PO), Take by mouth as needed Indications: for reflux, Disp: , Rfl:   Allergies:  Patient has no known allergies. Social History:    reports that he quit smoking about 42 years ago. His smoking use included cigarettes. He has never used smokeless tobacco. He reports current alcohol use of about 2.0 standard drinks of alcohol per week.  He

## 2024-06-03 ENCOUNTER — HOSPITAL ENCOUNTER (INPATIENT)
Age: 69
LOS: 2 days | Discharge: HOME | DRG: 871 | End: 2024-06-05
Attending: EMERGENCY MEDICINE | Admitting: INTERNAL MEDICINE
Payer: MEDICARE

## 2024-06-03 ENCOUNTER — APPOINTMENT (OUTPATIENT)
Dept: CT IMAGING | Age: 69
DRG: 871 | End: 2024-06-03
Payer: MEDICARE

## 2024-06-03 ENCOUNTER — APPOINTMENT (OUTPATIENT)
Dept: GENERAL RADIOLOGY | Age: 69
DRG: 871 | End: 2024-06-03
Payer: MEDICARE

## 2024-06-03 DIAGNOSIS — R09.02 HYPOXEMIA: ICD-10-CM

## 2024-06-03 DIAGNOSIS — J18.9 PNEUMONIA OF RIGHT LUNG DUE TO INFECTIOUS ORGANISM, UNSPECIFIED PART OF LUNG: ICD-10-CM

## 2024-06-03 DIAGNOSIS — R07.9 CHEST PAIN, UNSPECIFIED TYPE: Primary | ICD-10-CM

## 2024-06-03 PROBLEM — A41.9 SEPSIS (HCC): Status: ACTIVE | Noted: 2024-06-03

## 2024-06-03 PROBLEM — E78.5 HYPERLIPIDEMIA: Status: ACTIVE | Noted: 2024-06-03

## 2024-06-03 PROBLEM — E03.9 ACQUIRED HYPOTHYROIDISM: Status: ACTIVE | Noted: 2024-06-03

## 2024-06-03 PROBLEM — R00.0 TACHYCARDIA: Status: ACTIVE | Noted: 2024-06-03

## 2024-06-03 PROBLEM — D72.9 NEUTROPHILIC LEUKOCYTOSIS: Status: ACTIVE | Noted: 2024-06-03

## 2024-06-03 PROBLEM — R06.82 TACHYPNEA: Status: ACTIVE | Noted: 2024-06-03

## 2024-06-03 PROBLEM — D72.828 NEUTROPHILIC LEUKOCYTOSIS: Status: ACTIVE | Noted: 2024-06-03

## 2024-06-03 PROBLEM — E11.9 DMII (DIABETES MELLITUS, TYPE 2) (HCC): Status: ACTIVE | Noted: 2024-06-03

## 2024-06-03 LAB
ALBUMIN SERPL-MCNC: 4 G/DL (ref 3.4–5)
ALBUMIN/GLOB SERPL: 1.2 {RATIO} (ref 1.1–2.2)
ALP SERPL-CCNC: 127 U/L (ref 40–129)
ALT SERPL-CCNC: 11 U/L (ref 10–40)
ANION GAP SERPL CALCULATED.3IONS-SCNC: 12 MMOL/L (ref 3–16)
AST SERPL-CCNC: 17 U/L (ref 15–37)
BASOPHILS # BLD: 0.1 K/UL (ref 0–0.2)
BASOPHILS NFR BLD: 0.5 %
BILIRUB SERPL-MCNC: 0.3 MG/DL (ref 0–1)
BUN SERPL-MCNC: 13 MG/DL (ref 7–20)
CALCIUM SERPL-MCNC: 9.1 MG/DL (ref 8.3–10.6)
CHLORIDE SERPL-SCNC: 102 MMOL/L (ref 99–110)
CO2 SERPL-SCNC: 27 MMOL/L (ref 21–32)
CREAT SERPL-MCNC: 0.7 MG/DL (ref 0.8–1.3)
D-DIMER QUANTITATIVE: 0.56 UG/ML FEU (ref 0–0.6)
DEPRECATED RDW RBC AUTO: 15.7 % (ref 12.4–15.4)
EKG ATRIAL RATE: 74 BPM
EKG DIAGNOSIS: NORMAL
EKG P AXIS: 63 DEGREES
EKG P-R INTERVAL: 174 MS
EKG Q-T INTERVAL: 362 MS
EKG QRS DURATION: 88 MS
EKG QTC CALCULATION (BAZETT): 401 MS
EKG R AXIS: -11 DEGREES
EKG T AXIS: 30 DEGREES
EKG VENTRICULAR RATE: 74 BPM
EOSINOPHIL # BLD: 0.3 K/UL (ref 0–0.6)
EOSINOPHIL NFR BLD: 2.3 %
FLUAV RNA UPPER RESP QL NAA+PROBE: NEGATIVE
FLUBV AG NPH QL: NEGATIVE
GFR SERPLBLD CREATININE-BSD FMLA CKD-EPI: >90 ML/MIN/{1.73_M2}
GLUCOSE BLD-MCNC: 136 MG/DL (ref 70–99)
GLUCOSE BLD-MCNC: 201 MG/DL (ref 70–99)
GLUCOSE SERPL-MCNC: 157 MG/DL (ref 70–99)
HCT VFR BLD AUTO: 39.2 % (ref 40.5–52.5)
HGB BLD-MCNC: 13.2 G/DL (ref 13.5–17.5)
LACTATE BLDV-SCNC: 2 MMOL/L (ref 0.4–2)
LYMPHOCYTES # BLD: 1.4 K/UL (ref 1–5.1)
LYMPHOCYTES NFR BLD: 10.7 %
MCH RBC QN AUTO: 28 PG (ref 26–34)
MCHC RBC AUTO-ENTMCNC: 33.6 G/DL (ref 31–36)
MCV RBC AUTO: 83.2 FL (ref 80–100)
MONOCYTES # BLD: 0.6 K/UL (ref 0–1.3)
MONOCYTES NFR BLD: 4.5 %
NEUTROPHILS # BLD: 10.8 K/UL (ref 1.7–7.7)
NEUTROPHILS NFR BLD: 82 %
NT-PROBNP SERPL-MCNC: 71 PG/ML (ref 0–124)
PERFORMED ON: ABNORMAL
PERFORMED ON: ABNORMAL
PLATELET # BLD AUTO: 226 K/UL (ref 135–450)
PMV BLD AUTO: 7.8 FL (ref 5–10.5)
POTASSIUM SERPL-SCNC: 3.9 MMOL/L (ref 3.5–5.1)
PROCALCITONIN SERPL IA-MCNC: 0.05 NG/ML (ref 0–0.15)
PROT SERPL-MCNC: 7.3 G/DL (ref 6.4–8.2)
RBC # BLD AUTO: 4.72 M/UL (ref 4.2–5.9)
REASON FOR REJECTION: NORMAL
REJECTED TEST: NORMAL
REPORT: NORMAL
RESP PATH DNA+RNA PNL NPH NAA+NON-PROBE: NORMAL
SODIUM SERPL-SCNC: 141 MMOL/L (ref 136–145)
TROPONIN, HIGH SENSITIVITY: 11 NG/L (ref 0–22)
TROPONIN, HIGH SENSITIVITY: 13 NG/L (ref 0–22)
WBC # BLD AUTO: 13.1 K/UL (ref 4–11)

## 2024-06-03 PROCEDURE — 87804 INFLUENZA ASSAY W/OPTIC: CPT

## 2024-06-03 PROCEDURE — 6370000000 HC RX 637 (ALT 250 FOR IP): Performed by: INTERNAL MEDICINE

## 2024-06-03 PROCEDURE — 85025 COMPLETE CBC W/AUTO DIFF WBC: CPT

## 2024-06-03 PROCEDURE — 36415 COLL VENOUS BLD VENIPUNCTURE: CPT

## 2024-06-03 PROCEDURE — 80053 COMPREHEN METABOLIC PANEL: CPT

## 2024-06-03 PROCEDURE — 99285 EMERGENCY DEPT VISIT HI MDM: CPT

## 2024-06-03 PROCEDURE — 84484 ASSAY OF TROPONIN QUANT: CPT

## 2024-06-03 PROCEDURE — 2580000003 HC RX 258: Performed by: INTERNAL MEDICINE

## 2024-06-03 PROCEDURE — 6360000002 HC RX W HCPCS: Performed by: INTERNAL MEDICINE

## 2024-06-03 PROCEDURE — 0202U NFCT DS 22 TRGT SARS-COV-2: CPT

## 2024-06-03 PROCEDURE — 94640 AIRWAY INHALATION TREATMENT: CPT

## 2024-06-03 PROCEDURE — 6360000004 HC RX CONTRAST MEDICATION: Performed by: EMERGENCY MEDICINE

## 2024-06-03 PROCEDURE — 94761 N-INVAS EAR/PLS OXIMETRY MLT: CPT

## 2024-06-03 PROCEDURE — 83880 ASSAY OF NATRIURETIC PEPTIDE: CPT

## 2024-06-03 PROCEDURE — 83605 ASSAY OF LACTIC ACID: CPT

## 2024-06-03 PROCEDURE — 87449 NOS EACH ORGANISM AG IA: CPT

## 2024-06-03 PROCEDURE — 87040 BLOOD CULTURE FOR BACTERIA: CPT

## 2024-06-03 PROCEDURE — 71260 CT THORAX DX C+: CPT

## 2024-06-03 PROCEDURE — 85379 FIBRIN DEGRADATION QUANT: CPT

## 2024-06-03 PROCEDURE — 6370000000 HC RX 637 (ALT 250 FOR IP): Performed by: EMERGENCY MEDICINE

## 2024-06-03 PROCEDURE — 93005 ELECTROCARDIOGRAM TRACING: CPT | Performed by: EMERGENCY MEDICINE

## 2024-06-03 PROCEDURE — 2700000000 HC OXYGEN THERAPY PER DAY

## 2024-06-03 PROCEDURE — 84145 PROCALCITONIN (PCT): CPT

## 2024-06-03 PROCEDURE — 71045 X-RAY EXAM CHEST 1 VIEW: CPT

## 2024-06-03 PROCEDURE — 93010 ELECTROCARDIOGRAM REPORT: CPT | Performed by: INTERNAL MEDICINE

## 2024-06-03 PROCEDURE — 1200000000 HC SEMI PRIVATE

## 2024-06-03 RX ORDER — OXYCODONE HYDROCHLORIDE 5 MG/1
5 TABLET ORAL EVERY 4 HOURS PRN
Status: COMPLETED | OUTPATIENT
Start: 2024-06-03 | End: 2024-06-04

## 2024-06-03 RX ORDER — POTASSIUM CHLORIDE 20 MEQ/1
40 TABLET, EXTENDED RELEASE ORAL PRN
Status: DISCONTINUED | OUTPATIENT
Start: 2024-06-03 | End: 2024-06-05 | Stop reason: HOSPADM

## 2024-06-03 RX ORDER — MAGNESIUM SULFATE IN WATER 40 MG/ML
2000 INJECTION, SOLUTION INTRAVENOUS PRN
Status: DISCONTINUED | OUTPATIENT
Start: 2024-06-03 | End: 2024-06-05 | Stop reason: HOSPADM

## 2024-06-03 RX ORDER — ONDANSETRON 2 MG/ML
4 INJECTION INTRAMUSCULAR; INTRAVENOUS EVERY 4 HOURS PRN
Status: DISCONTINUED | OUTPATIENT
Start: 2024-06-03 | End: 2024-06-05 | Stop reason: HOSPADM

## 2024-06-03 RX ORDER — METFORMIN HYDROCHLORIDE 500 MG/1
500 TABLET, EXTENDED RELEASE ORAL
COMMUNITY

## 2024-06-03 RX ORDER — LEVOTHYROXINE SODIUM 0.05 MG/1
50 TABLET ORAL DAILY
Status: DISCONTINUED | OUTPATIENT
Start: 2024-06-03 | End: 2024-06-05 | Stop reason: HOSPADM

## 2024-06-03 RX ORDER — OMEPRAZOLE 40 MG/1
40 CAPSULE, DELAYED RELEASE ORAL DAILY
COMMUNITY

## 2024-06-03 RX ORDER — ENOXAPARIN SODIUM 100 MG/ML
40 INJECTION SUBCUTANEOUS DAILY
Status: DISCONTINUED | OUTPATIENT
Start: 2024-06-03 | End: 2024-06-05 | Stop reason: HOSPADM

## 2024-06-03 RX ORDER — IPRATROPIUM BROMIDE AND ALBUTEROL SULFATE 2.5; .5 MG/3ML; MG/3ML
1 SOLUTION RESPIRATORY (INHALATION) ONCE
Status: COMPLETED | OUTPATIENT
Start: 2024-06-03 | End: 2024-06-03

## 2024-06-03 RX ORDER — BENZONATATE 200 MG/1
200 CAPSULE ORAL 3 TIMES DAILY PRN
Status: DISCONTINUED | OUTPATIENT
Start: 2024-06-03 | End: 2024-06-05 | Stop reason: HOSPADM

## 2024-06-03 RX ORDER — AZITHROMYCIN 500 MG/1
500 TABLET, FILM COATED ORAL EVERY 24 HOURS
Status: DISCONTINUED | OUTPATIENT
Start: 2024-06-03 | End: 2024-06-05 | Stop reason: HOSPADM

## 2024-06-03 RX ORDER — SODIUM CHLORIDE 0.9 % (FLUSH) 0.9 %
10 SYRINGE (ML) INJECTION PRN
Status: DISCONTINUED | OUTPATIENT
Start: 2024-06-03 | End: 2024-06-05 | Stop reason: HOSPADM

## 2024-06-03 RX ORDER — POLYETHYLENE GLYCOL 3350 17 G/17G
17 POWDER, FOR SOLUTION ORAL DAILY PRN
Status: DISCONTINUED | OUTPATIENT
Start: 2024-06-03 | End: 2024-06-05 | Stop reason: HOSPADM

## 2024-06-03 RX ORDER — LEVOTHYROXINE SODIUM 0.05 MG/1
50 TABLET ORAL DAILY
COMMUNITY

## 2024-06-03 RX ORDER — ACETAMINOPHEN 650 MG/1
650 SUPPOSITORY RECTAL EVERY 4 HOURS PRN
Status: DISCONTINUED | OUTPATIENT
Start: 2024-06-03 | End: 2024-06-05 | Stop reason: HOSPADM

## 2024-06-03 RX ORDER — SODIUM CHLORIDE 9 MG/ML
INJECTION, SOLUTION INTRAVENOUS PRN
Status: DISCONTINUED | OUTPATIENT
Start: 2024-06-03 | End: 2024-06-05 | Stop reason: HOSPADM

## 2024-06-03 RX ORDER — ATORVASTATIN CALCIUM 40 MG/1
40 TABLET, FILM COATED ORAL NIGHTLY
Status: DISCONTINUED | OUTPATIENT
Start: 2024-06-03 | End: 2024-06-05 | Stop reason: HOSPADM

## 2024-06-03 RX ORDER — PANTOPRAZOLE SODIUM 40 MG/1
40 TABLET, DELAYED RELEASE ORAL
Status: DISCONTINUED | OUTPATIENT
Start: 2024-06-04 | End: 2024-06-05 | Stop reason: HOSPADM

## 2024-06-03 RX ORDER — ACETAMINOPHEN 325 MG/1
650 TABLET ORAL EVERY 4 HOURS PRN
Status: DISCONTINUED | OUTPATIENT
Start: 2024-06-03 | End: 2024-06-05 | Stop reason: HOSPADM

## 2024-06-03 RX ORDER — SODIUM CHLORIDE 0.9 % (FLUSH) 0.9 %
10 SYRINGE (ML) INJECTION EVERY 12 HOURS SCHEDULED
Status: DISCONTINUED | OUTPATIENT
Start: 2024-06-03 | End: 2024-06-05 | Stop reason: HOSPADM

## 2024-06-03 RX ORDER — ASPIRIN 81 MG/1
81 TABLET ORAL DAILY
Status: DISCONTINUED | OUTPATIENT
Start: 2024-06-03 | End: 2024-06-05 | Stop reason: HOSPADM

## 2024-06-03 RX ORDER — POTASSIUM CHLORIDE 7.45 MG/ML
10 INJECTION INTRAVENOUS PRN
Status: DISCONTINUED | OUTPATIENT
Start: 2024-06-03 | End: 2024-06-05 | Stop reason: HOSPADM

## 2024-06-03 RX ADMIN — BENZONATATE 200 MG: 200 CAPSULE ORAL at 21:06

## 2024-06-03 RX ADMIN — SODIUM CHLORIDE, PRESERVATIVE FREE 10 ML: 5 INJECTION INTRAVENOUS at 21:06

## 2024-06-03 RX ADMIN — OXYCODONE 5 MG: 5 TABLET ORAL at 21:06

## 2024-06-03 RX ADMIN — ASPIRIN 81 MG: 81 TABLET, COATED ORAL at 14:12

## 2024-06-03 RX ADMIN — AZITHROMYCIN 500 MG: 500 TABLET, FILM COATED ORAL at 14:12

## 2024-06-03 RX ADMIN — LEVOTHYROXINE SODIUM 50 MCG: 0.05 TABLET ORAL at 14:14

## 2024-06-03 RX ADMIN — IPRATROPIUM BROMIDE AND ALBUTEROL SULFATE 1 DOSE: 2.5; .5 SOLUTION RESPIRATORY (INHALATION) at 10:12

## 2024-06-03 RX ADMIN — ACETAMINOPHEN 325MG 650 MG: 325 TABLET ORAL at 21:06

## 2024-06-03 RX ADMIN — ATORVASTATIN CALCIUM 40 MG: 40 TABLET, FILM COATED ORAL at 21:06

## 2024-06-03 RX ADMIN — IOPAMIDOL 75 ML: 755 INJECTION, SOLUTION INTRAVENOUS at 09:47

## 2024-06-03 RX ADMIN — WATER 1000 MG: 1 INJECTION INTRAMUSCULAR; INTRAVENOUS; SUBCUTANEOUS at 14:16

## 2024-06-03 RX ADMIN — BENZONATATE 200 MG: 200 CAPSULE ORAL at 14:14

## 2024-06-03 RX ADMIN — OXYCODONE 5 MG: 5 TABLET ORAL at 14:39

## 2024-06-03 RX ADMIN — ENOXAPARIN SODIUM 40 MG: 100 INJECTION SUBCUTANEOUS at 14:12

## 2024-06-03 ASSESSMENT — PAIN DESCRIPTION - ORIENTATION
ORIENTATION: LEFT
ORIENTATION: MID
ORIENTATION: MID

## 2024-06-03 ASSESSMENT — PAIN DESCRIPTION - LOCATION
LOCATION: CHEST

## 2024-06-03 ASSESSMENT — PAIN SCALES - GENERAL
PAINLEVEL_OUTOF10: 4
PAINLEVEL_OUTOF10: 7
PAINLEVEL_OUTOF10: 3
PAINLEVEL_OUTOF10: 6
PAINLEVEL_OUTOF10: 8

## 2024-06-03 ASSESSMENT — PAIN DESCRIPTION - FREQUENCY: FREQUENCY: INTERMITTENT

## 2024-06-03 ASSESSMENT — PAIN DESCRIPTION - DESCRIPTORS
DESCRIPTORS: SHARP
DESCRIPTORS: SHARP

## 2024-06-03 ASSESSMENT — PAIN - FUNCTIONAL ASSESSMENT: PAIN_FUNCTIONAL_ASSESSMENT: PREVENTS OR INTERFERES SOME ACTIVE ACTIVITIES AND ADLS

## 2024-06-03 ASSESSMENT — PAIN DESCRIPTION - ONSET: ONSET: GRADUAL

## 2024-06-03 NOTE — ED TRIAGE NOTES
Pt arrived from home via EMS c/o midsternal chest pain beginning this morning. Pt states chest pain suddenly started this morning after he woke up with a new mild cough. EMS gave 324mg of aspirin and 1 SL nitro with little effect on the chest pain but they reported hypotension after administration so it was not repeated. Pt arrived on room air at 89% SPO2 and placed on 2 liters NC. Pt was tachypnic upon arrival, normal sinus on the monitor, skin warm and dry. Pt appears tired but is A&Ox4. Ambulatory independently at baseline.   
verbal instruction/written material

## 2024-06-03 NOTE — ED NOTES
Requested phlebotomy for blood cultures to avoid further delay in ordered inpatient antibiotics. Transportation request placed for ED to inpatient transfer: ETA approximately 30 minutes.

## 2024-06-03 NOTE — PROGRESS NOTES
Patient arrives to room 4258 via stretcher. Patient is alert and oriented x 4. Respirations easy and even, shortness of breath noted on exertion. Oxygen saturation 96% on 2 liter NC. Vs stable. Patient C/O 6/10 mid chest pain with deep breaths. See MAR for recent pain dose. Oriented to room and call light. See initial assessment. Spouse at bedside. Call light in reach. Fall precautions in place.

## 2024-06-03 NOTE — CARE COORDINATION
Case Management Assessment  Initial Evaluation    Date/Time of Evaluation: 6/3/2024 12:11 PM  Assessment Completed by: FLIP Nicole    If patient is discharged prior to next notation, then this note serves as note for discharge by case management.    Patient Name: Clyde Salgado                   YOB: 1955  Diagnosis: Pneumonia due to infectious agent [J18.9]                   Date / Time: 6/3/2024  8:27 AM    Patient Admission Status: Inpatient   Readmission Risk (Low < 19, Mod (19-27), High > 27): Readmission Risk Score: 7.7    Current PCP: Marnie Pablo MD  PCP verified by CM? Yes    Chart Reviewed: Yes      History Provided by: Patient  Patient Orientation: Alert and Oriented    Patient Cognition: Alert    Hospitalization in the last 30 days (Readmission):  No    If yes, Readmission Assessment in  Navigator will be completed.    Advance Directives:      Code Status: Prior   Patient's Primary Decision Maker is: Legal Next of Kin      Discharge Planning:    Patient lives with: Spouse/Significant Other Type of Home: Trailer/Mobile Home  Primary Care Giver: Self  Patient Support Systems include: Spouse/Significant Other, Family Members   Current Financial resources: Medicare  Current community resources: None  Current services prior to admission: None            Current DME:              Type of Home Care services:  None    ADLS  Prior functional level: Independent in ADLs/IADLs  Current functional level: Independent in ADLs/IADLs    PT AM-PAC:   /24  OT AM-PAC:   /24    Family can provide assistance at DC: Yes  Would you like Case Management to discuss the discharge plan with any other family members/significant others, and if so, who? Yes (Wife Tawnya)  Plans to Return to Present Housing: Yes  Other Identified Issues/Barriers to RETURNING to current housing: none  Potential Assistance needed at discharge: N/A            Potential DME:    Patient expects to discharge to: Trailer/mobile

## 2024-06-03 NOTE — PROGRESS NOTES
4 Eyes Skin Assessment     NAME:  Clyde Salgado  YOB: 1955  MEDICAL RECORD NUMBER:  [id number]    The patient is being assessed for  Admission    I agree that at least one RN has performed a thorough Head to Toe Skin Assessment on the patient. ALL assessment sites listed below have been assessed.      Areas assessed by both nurses:    Head, Face, Ears, Shoulders, Back, Chest, Arms, Elbows, Hands, Sacrum. Buttock, Coccyx, Ischium, Legs. Feet and Heels, and Under Medical Devices         Does the Patient have a Wound? No noted wound(s)       Christ Prevention initiated by RN: Yes  Wound Care Orders initiated by RN: No    Pressure Injury (Stage 3,4, Unstageable, DTI, NWPT, and Complex wounds) if present, place Wound referral order by RN under : No    New Ostomies, if present place, Ostomy referral order under : No     Nurse 1 eSignature: Electronically signed by Jayde Jean RN on 6/3/24 at 2:37 PM EDT    **SHARE this note so that the co-signing nurse can place an eSignature**    Nurse 2 eSignature: Electronically signed by Brooks Khan RN on 6/3/24 at 6:49 PM EDT

## 2024-06-03 NOTE — H&P
upper lobe measuring 5 mm. Per Fleischner Society Guidelines, recommend a non-contrast Chest CT at 3-6 months. If stable, consider follow-up non-contrast Chest CTs at 2 and 4 years. These guidelines do not apply to immunocompromised patients and patients with cancer. Follow up in patients with significant comorbidities as clinically warranted. For lung cancer screening, adhere to Lung-RADS guidelines. Reference: Radiology. 2017; 284(1):228-43.     XR CHEST PORTABLE    Result Date: 6/3/2024  EXAMINATION: ONE XRAY VIEW OF THE CHEST 6/3/2024 8:40 am COMPARISON: 19 February 2023 HISTORY: ORDERING SYSTEM PROVIDED HISTORY: Chest Pain TECHNOLOGIST PROVIDED HISTORY: \"IF\" patient is hemodynamically unstable. Indication: Chest Pain Reason for exam:->Chest Pain Reason for Exam: Chest Pain FINDINGS: Lungs: Hypoinflated.  Lordotic.  Increased interstitial markings in a reticulonodular pattern, similar to the prior study.  Negative for lobar infiltrate effusion or pneumothorax. Heart and mediastinum: Heart is normal in size. Trachea is midline.Lisa are symmetrical, no mass. Osseous structures: Unremarkable Abdomen: Nonspecific bowel gas pattern.     Hypoinflated chest.  Stable chronically increased interstitial markings.  No acute finding and little changed compared to the prior study.         EKG:   Read by ER in the absence of a Cardiologist shows normal sinus rhythm. Rate 74. MD interval 174 ms. QRS duration 88 ms. QTc 401 ms. R axis -11 degrees. There is no ST elevation. Q waves noted in the anterior septal leads. EKG is unchanged from February 19, 2023. No acute change       Assessment:   Principal Problem:    Pneumonia due to infectious agent  Active Problems:    Sepsis (HCC)    Tachycardia    Tachypnea    Neutrophilic leukocytosis    Acquired hypothyroidism    Hyperlipidemia    DMII (diabetes mellitus, type 2) (HCC)  Resolved Problems:    * No resolved hospital problems. *      Plan:       Pneumonia due to infectious agent

## 2024-06-03 NOTE — ED NOTES
Spoke with respiratory therapy regarding the respiratory culture. RT states that the pt is too dry right now. The specimen has to be collected under suction and will need to be lavaged.

## 2024-06-03 NOTE — ACP (ADVANCE CARE PLANNING)
planning  [] Referred individual to Provider for additional questions/concerns   [] Advised patient/agent/surrogate to review completed ACP document and update if needed with changes in condition, patient preferences or care setting    [x] This note routed to one or more involved healthcare providers

## 2024-06-03 NOTE — ED NOTES
Report called to Jayde MOBLEY on [address]. EDSBAR discussed. RN requested respiratory culture before sending pt to floor. Will notify RT.

## 2024-06-03 NOTE — ED PROVIDER NOTES
[facility] EMERGENCY DEPARTMENT  EMERGENCY DEPARTMENT ENCOUNTER      Pt Name: Clyed Salgado  MRN: [id number]  Birthdate 1955  Date of evaluation: 6/3/2024  Provider: COSTA ALICIA DO    CHIEF COMPLAINT       Chief Complaint   Patient presents with    Chest Pain     Mid sternal CP starting this morning, 324mg asa given, minimal CP relief with one SL nitro, not repeated d/t hypotension         HISTORY OF PRESENT ILLNESS   (Location/Symptom, Timing/Onset, Context/Setting, Quality, Duration, Modifying Factors, Severity)  Note limiting factors.   Clyde Salgado is a 68 y.o. male who presents to the emergency department with a complaint of midsternal chest pain that began this morning at approximately 6:30 AM.  Patient went to bed last night at 11 PM and felt fine.  He got up to go to the bathroom at 2:30 AM and felt fine.  He awakened this morning at 5:30 AM and reports that he was sitting on the couch at approximately 6:30 AM and developed sudden sharp pain in the left anterior chest.  He states that the sharp pain subsided after a few minutes and then felt tight.  He denies any neck pain jaw pain back pain or pain in the arms.  No radicular pain.  He denies any associated diaphoresis syncope or palpitations.  He does report some shortness of breath since waking this morning.    He denies any previous occurrence.  No recent chest pain or shortness of breath.  No dyspnea on exertion.  No orthopnea.    He denies any prior personal history of coronary artery disease or thromboembolic disease.  No family history of thromboembolic disease.    He does report that his father developed heart disease in his 50s and required a CABG.    He denies any recent travel, leg pain, calf pain, or swelling.  No hormone medications.  No recent surgeries or procedures.    He was given aspirin 324 mg and route to the hospital.  He was given nitroglycerin 1 tablet sublingually with minimal relief.

## 2024-06-03 NOTE — PROGRESS NOTES
Medication Reconciliation    List of medications patient is currently taking is complete.     Source of information: 1. Conversation with patient at [facility]                                      2. EPIC records     Notes regarding home medications:   1. Patient received ASA earlier this morning - he has not received any of his other home medications yet today.    Carlton Noland, [facility], PharmD, BCPS  6/3/2024 11:05 AM

## 2024-06-04 ENCOUNTER — HOSPITAL ENCOUNTER (INPATIENT)
Age: 69
Discharge: HOME | DRG: 871 | End: 2024-06-06
Payer: MEDICARE

## 2024-06-04 ENCOUNTER — APPOINTMENT (OUTPATIENT)
Dept: NUCLEAR MEDICINE | Age: 69
DRG: 871 | End: 2024-06-04
Payer: MEDICARE

## 2024-06-04 LAB
ANION GAP SERPL CALCULATED.3IONS-SCNC: 9 MMOL/L (ref 3–16)
BASOPHILS # BLD: 0.1 K/UL (ref 0–0.2)
BASOPHILS NFR BLD: 0.8 %
BUN SERPL-MCNC: 13 MG/DL (ref 7–20)
CALCIUM SERPL-MCNC: 8.8 MG/DL (ref 8.3–10.6)
CHLORIDE SERPL-SCNC: 101 MMOL/L (ref 99–110)
CO2 SERPL-SCNC: 29 MMOL/L (ref 21–32)
CREAT SERPL-MCNC: 0.7 MG/DL (ref 0.8–1.3)
DEPRECATED RDW RBC AUTO: 16.1 % (ref 12.4–15.4)
EOSINOPHIL # BLD: 0.3 K/UL (ref 0–0.6)
EOSINOPHIL NFR BLD: 3 %
GFR SERPLBLD CREATININE-BSD FMLA CKD-EPI: >90 ML/MIN/{1.73_M2}
GLUCOSE BLD-MCNC: 113 MG/DL (ref 70–99)
GLUCOSE BLD-MCNC: 133 MG/DL (ref 70–99)
GLUCOSE SERPL-MCNC: 118 MG/DL (ref 70–99)
HCT VFR BLD AUTO: 36.9 % (ref 40.5–52.5)
HGB BLD-MCNC: 12.4 G/DL (ref 13.5–17.5)
LEGIONELLA AG UR QL: NORMAL
LYMPHOCYTES # BLD: 1.5 K/UL (ref 1–5.1)
LYMPHOCYTES NFR BLD: 14.7 %
MCH RBC QN AUTO: 27.9 PG (ref 26–34)
MCHC RBC AUTO-ENTMCNC: 33.5 G/DL (ref 31–36)
MCV RBC AUTO: 83.3 FL (ref 80–100)
MONOCYTES # BLD: 0.9 K/UL (ref 0–1.3)
MONOCYTES NFR BLD: 8.7 %
NEUTROPHILS # BLD: 7.6 K/UL (ref 1.7–7.7)
NEUTROPHILS NFR BLD: 72.8 %
PERFORMED ON: ABNORMAL
PERFORMED ON: ABNORMAL
PLATELET # BLD AUTO: 234 K/UL (ref 135–450)
PMV BLD AUTO: 8 FL (ref 5–10.5)
POTASSIUM SERPL-SCNC: 4.1 MMOL/L (ref 3.5–5.1)
RBC # BLD AUTO: 4.43 M/UL (ref 4.2–5.9)
S PNEUM AG UR QL: NORMAL
SODIUM SERPL-SCNC: 139 MMOL/L (ref 136–145)
WBC # BLD AUTO: 10.5 K/UL (ref 4–11)

## 2024-06-04 PROCEDURE — 93017 CV STRESS TEST TRACING ONLY: CPT

## 2024-06-04 PROCEDURE — 85025 COMPLETE CBC W/AUTO DIFF WBC: CPT

## 2024-06-04 PROCEDURE — 2700000000 HC OXYGEN THERAPY PER DAY

## 2024-06-04 PROCEDURE — A9502 TC99M TETROFOSMIN: HCPCS | Performed by: INTERNAL MEDICINE

## 2024-06-04 PROCEDURE — 36415 COLL VENOUS BLD VENIPUNCTURE: CPT

## 2024-06-04 PROCEDURE — 1200000000 HC SEMI PRIVATE

## 2024-06-04 PROCEDURE — 3430000000 HC RX DIAGNOSTIC RADIOPHARMACEUTICAL: Performed by: INTERNAL MEDICINE

## 2024-06-04 PROCEDURE — 97162 PT EVAL MOD COMPLEX 30 MIN: CPT

## 2024-06-04 PROCEDURE — 94761 N-INVAS EAR/PLS OXIMETRY MLT: CPT

## 2024-06-04 PROCEDURE — 6370000000 HC RX 637 (ALT 250 FOR IP): Performed by: INTERNAL MEDICINE

## 2024-06-04 PROCEDURE — 80048 BASIC METABOLIC PNL TOTAL CA: CPT

## 2024-06-04 PROCEDURE — 78452 HT MUSCLE IMAGE SPECT MULT: CPT

## 2024-06-04 PROCEDURE — 6360000002 HC RX W HCPCS: Performed by: INTERNAL MEDICINE

## 2024-06-04 PROCEDURE — 97530 THERAPEUTIC ACTIVITIES: CPT

## 2024-06-04 PROCEDURE — 6370000000 HC RX 637 (ALT 250 FOR IP): Performed by: NURSE PRACTITIONER

## 2024-06-04 PROCEDURE — 97165 OT EVAL LOW COMPLEX 30 MIN: CPT

## 2024-06-04 PROCEDURE — 2580000003 HC RX 258: Performed by: INTERNAL MEDICINE

## 2024-06-04 RX ORDER — OXYCODONE HYDROCHLORIDE 5 MG/1
5 TABLET ORAL EVERY 4 HOURS PRN
Status: DISCONTINUED | OUTPATIENT
Start: 2024-06-04 | End: 2024-06-05 | Stop reason: HOSPADM

## 2024-06-04 RX ADMIN — POLYETHYLENE GLYCOL 3350 17 G: 17 POWDER, FOR SOLUTION ORAL at 22:15

## 2024-06-04 RX ADMIN — PANTOPRAZOLE SODIUM 40 MG: 40 TABLET, DELAYED RELEASE ORAL at 06:18

## 2024-06-04 RX ADMIN — AZITHROMYCIN 500 MG: 500 TABLET, FILM COATED ORAL at 14:02

## 2024-06-04 RX ADMIN — ATORVASTATIN CALCIUM 40 MG: 40 TABLET, FILM COATED ORAL at 20:55

## 2024-06-04 RX ADMIN — SODIUM CHLORIDE, PRESERVATIVE FREE 10 ML: 5 INJECTION INTRAVENOUS at 08:13

## 2024-06-04 RX ADMIN — WATER 1000 MG: 1 INJECTION INTRAMUSCULAR; INTRAVENOUS; SUBCUTANEOUS at 14:02

## 2024-06-04 RX ADMIN — LEVOTHYROXINE SODIUM 50 MCG: 0.05 TABLET ORAL at 06:18

## 2024-06-04 RX ADMIN — OXYCODONE HYDROCHLORIDE 5 MG: 5 TABLET ORAL at 20:55

## 2024-06-04 RX ADMIN — SODIUM CHLORIDE, PRESERVATIVE FREE 10 ML: 5 INJECTION INTRAVENOUS at 20:56

## 2024-06-04 RX ADMIN — BENZONATATE 200 MG: 200 CAPSULE ORAL at 20:55

## 2024-06-04 RX ADMIN — OXYCODONE 5 MG: 5 TABLET ORAL at 08:12

## 2024-06-04 RX ADMIN — ASPIRIN 81 MG: 81 TABLET, COATED ORAL at 08:12

## 2024-06-04 RX ADMIN — ENOXAPARIN SODIUM 40 MG: 100 INJECTION SUBCUTANEOUS at 08:12

## 2024-06-04 RX ADMIN — TETROFOSMIN 12.1 MILLICURIE: 1.38 INJECTION, POWDER, LYOPHILIZED, FOR SOLUTION INTRAVENOUS at 11:13

## 2024-06-04 RX ADMIN — BENZONATATE 200 MG: 200 CAPSULE ORAL at 08:11

## 2024-06-04 ASSESSMENT — PAIN DESCRIPTION - DESCRIPTORS
DESCRIPTORS: ACHING;HEAVINESS
DESCRIPTORS: TIGHTNESS

## 2024-06-04 ASSESSMENT — PAIN DESCRIPTION - ORIENTATION
ORIENTATION: MID
ORIENTATION: MID

## 2024-06-04 ASSESSMENT — PAIN DESCRIPTION - FREQUENCY: FREQUENCY: INTERMITTENT

## 2024-06-04 ASSESSMENT — PAIN DESCRIPTION - LOCATION
LOCATION: CHEST
LOCATION: CHEST

## 2024-06-04 ASSESSMENT — PAIN - FUNCTIONAL ASSESSMENT: PAIN_FUNCTIONAL_ASSESSMENT: PREVENTS OR INTERFERES SOME ACTIVE ACTIVITIES AND ADLS

## 2024-06-04 ASSESSMENT — PAIN SCALES - GENERAL
PAINLEVEL_OUTOF10: 5
PAINLEVEL_OUTOF10: 0
PAINLEVEL_OUTOF10: 7

## 2024-06-04 ASSESSMENT — PAIN DESCRIPTION - PAIN TYPE: TYPE: ACUTE PAIN

## 2024-06-04 NOTE — ACP (ADVANCE CARE PLANNING)
to Provider for additional questions/concerns   [] Advised patient/agent/surrogate to review completed ACP document and update if needed with changes in condition, patient preferences or care setting    [x] This note routed to one or more involved healthcare providers Marnie Pablo MD primary care physician.     Respectfully submitted,    Rosey HADDAD, JIMMY  [facility]   [phone]    Electronically signed by BOO Shelton, FLIP on 6/4/2024 at 8:09 AM

## 2024-06-04 NOTE — PROGRESS NOTES
Hospitalist   Progress Note    Patient Name: Clyde Salgado  PCP: Marnie Pablo MD  Date of Admission: 6/3/2024    Chief Complaint on Admission: Shortness of breath, chest pain   Chief diagnosis after evaluation: Pneumonia    Brief Synopsis: Patient is a 68 y.o. man who has a past medical history of Acquired hypothyroidism, DMII (diabetes mellitus, type 2) ([facility]), GERD (gastroesophageal reflux disease), Hyperlipidemia, Movement disorder, Pneumonia due to infectious agent, and Uncomplicated asthma. who was admitted on 6/3/2024 for evaluation and treatment of Pneumonia    Pt Seen/Examined and Chart Reviewed.     Subjective: Pt is feeling better and has no new complaints. He continues to have some chest tightness. Wife at bedside    Objective:  Allergies  Patient has no known allergies.    Medications    Scheduled Meds:   pantoprazole  40 mg Oral QAM AC    levothyroxine  50 mcg Oral Daily    atorvastatin  40 mg Oral Nightly    aspirin  81 mg Oral Daily    sodium chloride flush  10 mL IntraVENous 2 times per day    enoxaparin  40 mg SubCUTAneous Daily    cefTRIAXone (ROCEPHIN) IV  1,000 mg IntraVENous Q24H    And    azithromycin  500 mg Oral Q24H     Infusions:   sodium chloride       PRN Meds:  sodium chloride flush, sodium chloride, potassium chloride **OR** potassium alternative oral replacement **OR** potassium chloride, magnesium sulfate, ondansetron, polyethylene glycol, acetaminophen **OR** acetaminophen, benzonatate    Physical    VITALS:  /68   Pulse 69   Temp 99 °F (37.2 °C) (Oral)   Resp 18   Ht 1.676 m (5' 6\")   Wt 83.4 kg (183 lb 13.8 oz)   SpO2 98%   BMI 29.68 kg/m²   CONSTITUTIONAL:  WD/WN 68 y.o. year-old male who is awake, alert, cooperative, no apparent distress, and appears stated age  EYES:  Lids and lashes normal, PERRL, EOMI, sclera clear, conjunctiva normal  ENT:  NC/AT, MMM    NECK:  Supple, symmetrical, trachea midline, no adenopathy  HEMATOLOGIC/LYMPHATICS:  no cervical,

## 2024-06-04 NOTE — PRE-PROCEDURE INSTRUCTIONS
Prep for stress test on 6/5/2024:   No caffeine or decaffeinated products after 20:00 this evening.  Hold solid food after 05:00 on 06/05/2024: may have water as desired  / ordered.  .es

## 2024-06-04 NOTE — PROGRESS NOTES
Occupational Therapy  Facility/Department: [address] MED SURG  Occupational Therapy Initial Assessment/Discharge    Name: Clyde Salgado  : 1955  MRN: [id number]  Date of Service: 2024    Discharge Recommendations:  Home with assist PRN     Clyde Salgado scored a 24/24 on the -[facility] ADL Inpatient form.  At this time, no further OT is recommended upon discharge due to pt nearing baseline.  Recommend patient returns to prior setting with prior services.         Patient Diagnosis(es): The primary encounter diagnosis was Chest pain, unspecified type. Diagnoses of Pneumonia of right lung due to infectious organism, unspecified part of lung and Hypoxemia were also pertinent to this visit.  Past Medical History:  has a past medical history of Acquired hypothyroidism, DMII (diabetes mellitus, type 2) (HCC), GERD (gastroesophageal reflux disease), Hyperlipidemia, Movement disorder, Pneumonia due to infectious agent, and Uncomplicated asthma.  Past Surgical History:  has a past surgical history that includes Rotator cuff repair.       Assessment   Assessment: Pt is a 67 yo M who presented with SOB and chest pain, admitted with pneumonia. Prior to admit, pt lives in a mobile home with his wife where he is typically independent. He is functioning near baseline at this time, completing functional transfers and mobility independently and anticipate independence in full ADL. He was weaned from 1L O2 to room air w/ sats remaining above 90%. No further acute OT needs at this time. Anticipate pt will be safe to return home w/ assist PRN when medically stable.  Decision Making: Low Complexity  REQUIRES OT FOLLOW-UP: No  Activity Tolerance  Activity Tolerance: Patient Tolerated treatment well        Plan   Occupational Therapy Plan  Times Per Week: d/c OT     Restrictions  Restrictions/Precautions  Restrictions/Precautions: Up Ad Vilma  Position Activity Restriction  Other position/activity restrictions: 1L to

## 2024-06-04 NOTE — PLAN OF CARE
Problem: Discharge Planning  Goal: Discharge to home or other facility with appropriate resources  Outcome: Progressing  Flowsheets (Taken 6/3/2024 2103)  Discharge to home or other facility with appropriate resources: Identify barriers to discharge with patient and caregiver     Problem: Pain  Goal: Verbalizes/displays adequate comfort level or baseline comfort level  Outcome: Progressing     Problem: Respiratory - Adult  Goal: Achieves optimal ventilation and oxygenation  Outcome: Progressing     Problem: Cardiovascular - Adult  Goal: Maintains optimal cardiac output and hemodynamic stability  Outcome: Progressing     Problem: Infection - Adult  Goal: Absence of infection at discharge  Outcome: Progressing     Problem: Metabolic/Fluid and Electrolytes - Adult  Goal: Electrolytes maintained within normal limits  Outcome: Progressing  Goal: Hemodynamic stability and optimal renal function maintained  Outcome: Progressing  Goal: Glucose maintained within prescribed range  Outcome: Progressing     Problem: Hematologic - Adult  Goal: Maintains hematologic stability  Outcome: Progressing

## 2024-06-04 NOTE — PLAN OF CARE
Problem: Discharge Planning  Goal: Discharge to home or other facility with appropriate resources  6/4/2024 0932 by Latricia Sy RN  Outcome: Progressing  6/3/2024 2333 by Anny Cunningham RN  Outcome: Progressing  Flowsheets (Taken 6/3/2024 2103)  Discharge to home or other facility with appropriate resources: Identify barriers to discharge with patient and caregiver     Problem: Pain  Goal: Verbalizes/displays adequate comfort level or baseline comfort level  6/4/2024 0932 by Latricia Sy RN  Outcome: Progressing  6/3/2024 2333 by Anny Cunningham RN  Outcome: Progressing     Problem: Respiratory - Adult  Goal: Achieves optimal ventilation and oxygenation  6/4/2024 0932 by Latricia Sy RN  Outcome: Progressing  6/3/2024 2333 by Anny Cunningham RN  Outcome: Progressing     Problem: Cardiovascular - Adult  Goal: Maintains optimal cardiac output and hemodynamic stability  6/4/2024 0932 by Latricia Sy RN  Outcome: Progressing  6/3/2024 2333 by Anny Cunningham RN  Outcome: Progressing     Problem: Infection - Adult  Goal: Absence of infection at discharge  6/4/2024 0932 by Latricia Sy RN  Outcome: Progressing  6/3/2024 2333 by Anny Cunningham RN  Outcome: Progressing     Problem: Metabolic/Fluid and Electrolytes - Adult  Goal: Electrolytes maintained within normal limits  6/4/2024 0932 by Latricia Sy RN  Outcome: Progressing  6/3/2024 2333 by Anny Cunningham RN  Outcome: Progressing  Goal: Hemodynamic stability and optimal renal function maintained  6/4/2024 0932 by Latricia Sy RN  Outcome: Progressing  6/3/2024 2333 by Anny Cunningham RN  Outcome: Progressing  Goal: Glucose maintained within prescribed range  6/4/2024 0932 by Latricia Sy RN  Outcome: Progressing  6/3/2024 2333 by Anny Cunningham RN  Outcome: Progressing     Problem: Hematologic - Adult  Goal: Maintains hematologic stability  6/4/2024 0932 by Latricia Sy RN  Outcome:

## 2024-06-05 VITALS
OXYGEN SATURATION: 93 % | BODY MASS INDEX: 28.45 KG/M2 | HEIGHT: 66 IN | RESPIRATION RATE: 16 BRPM | WEIGHT: 177.03 LBS | HEART RATE: 72 BPM | SYSTOLIC BLOOD PRESSURE: 117 MMHG | TEMPERATURE: 98.3 F | DIASTOLIC BLOOD PRESSURE: 75 MMHG

## 2024-06-05 VITALS
HEIGHT: 66 IN | WEIGHT: 177 LBS | SYSTOLIC BLOOD PRESSURE: 126 MMHG | BODY MASS INDEX: 28.45 KG/M2 | HEART RATE: 68 BPM | DIASTOLIC BLOOD PRESSURE: 62 MMHG

## 2024-06-05 LAB
ANION GAP SERPL CALCULATED.3IONS-SCNC: 9 MMOL/L (ref 3–16)
BASOPHILS # BLD: 0 K/UL (ref 0–0.2)
BASOPHILS NFR BLD: 0.4 %
BUN SERPL-MCNC: 10 MG/DL (ref 7–20)
CALCIUM SERPL-MCNC: 9.1 MG/DL (ref 8.3–10.6)
CHLORIDE SERPL-SCNC: 98 MMOL/L (ref 99–110)
CO2 SERPL-SCNC: 28 MMOL/L (ref 21–32)
CREAT SERPL-MCNC: 0.7 MG/DL (ref 0.8–1.3)
DEPRECATED RDW RBC AUTO: 16 % (ref 12.4–15.4)
ECHO BSA: 1.93 M2
EOSINOPHIL # BLD: 0.4 K/UL (ref 0–0.6)
EOSINOPHIL NFR BLD: 4.3 %
GFR SERPLBLD CREATININE-BSD FMLA CKD-EPI: >90 ML/MIN/{1.73_M2}
GLUCOSE BLD-MCNC: 112 MG/DL (ref 70–99)
GLUCOSE BLD-MCNC: 142 MG/DL (ref 70–99)
GLUCOSE SERPL-MCNC: 105 MG/DL (ref 70–99)
HCT VFR BLD AUTO: 35.1 % (ref 40.5–52.5)
HGB BLD-MCNC: 12.2 G/DL (ref 13.5–17.5)
LYMPHOCYTES # BLD: 1.7 K/UL (ref 1–5.1)
LYMPHOCYTES NFR BLD: 20.4 %
MCH RBC QN AUTO: 28.6 PG (ref 26–34)
MCHC RBC AUTO-ENTMCNC: 34.8 G/DL (ref 31–36)
MCV RBC AUTO: 82.3 FL (ref 80–100)
MONOCYTES # BLD: 0.7 K/UL (ref 0–1.3)
MONOCYTES NFR BLD: 8.1 %
NEUTROPHILS # BLD: 5.6 K/UL (ref 1.7–7.7)
NEUTROPHILS NFR BLD: 66.8 %
NUC REST EJECTION FRACTION: 75 %
PERFORMED ON: ABNORMAL
PERFORMED ON: ABNORMAL
PLATELET # BLD AUTO: 222 K/UL (ref 135–450)
PMV BLD AUTO: 8.1 FL (ref 5–10.5)
POTASSIUM SERPL-SCNC: 4.1 MMOL/L (ref 3.5–5.1)
RBC # BLD AUTO: 4.26 M/UL (ref 4.2–5.9)
SODIUM SERPL-SCNC: 135 MMOL/L (ref 136–145)
STRESS BASELINE DIAS BP: 62 MMHG
STRESS BASELINE HR: 70 BPM
STRESS BASELINE SYS BP: 126 MMHG
STRESS ESTIMATED WORKLOAD: 1 METS
STRESS EXERCISE DUR MIN: 1 MIN
STRESS EXERCISE DUR SEC: 40 SEC
STRESS O2 SAT PEAK: 97 %
STRESS O2 SAT REST: 92 %
STRESS PEAK DIAS BP: 69 MMHG
STRESS PEAK SYS BP: 127 MMHG
STRESS PERCENT HR ACHIEVED: 66 %
STRESS POST PEAK HR: 100 BPM
STRESS RATE PRESSURE PRODUCT: NORMAL BPM*MMHG
STRESS ST DEPRESSION: 0 MM
STRESS TARGET HR: 151 BPM
WBC # BLD AUTO: 8.4 K/UL (ref 4–11)

## 2024-06-05 PROCEDURE — 94760 N-INVAS EAR/PLS OXIMETRY 1: CPT

## 2024-06-05 PROCEDURE — 3430000000 HC RX DIAGNOSTIC RADIOPHARMACEUTICAL: Performed by: INTERNAL MEDICINE

## 2024-06-05 PROCEDURE — 6360000002 HC RX W HCPCS: Performed by: INTERNAL MEDICINE

## 2024-06-05 PROCEDURE — A9502 TC99M TETROFOSMIN: HCPCS | Performed by: INTERNAL MEDICINE

## 2024-06-05 PROCEDURE — 80048 BASIC METABOLIC PNL TOTAL CA: CPT

## 2024-06-05 PROCEDURE — 36415 COLL VENOUS BLD VENIPUNCTURE: CPT

## 2024-06-05 PROCEDURE — 2580000003 HC RX 258: Performed by: INTERNAL MEDICINE

## 2024-06-05 PROCEDURE — 6370000000 HC RX 637 (ALT 250 FOR IP): Performed by: INTERNAL MEDICINE

## 2024-06-05 PROCEDURE — 85025 COMPLETE CBC W/AUTO DIFF WBC: CPT

## 2024-06-05 RX ORDER — REGADENOSON 0.08 MG/ML
0.4 INJECTION, SOLUTION INTRAVENOUS
Status: COMPLETED | OUTPATIENT
Start: 2024-06-05 | End: 2024-06-05

## 2024-06-05 RX ORDER — REGADENOSON 0.08 MG/ML
0.4 INJECTION, SOLUTION INTRAVENOUS
Status: DISCONTINUED | OUTPATIENT
Start: 2024-06-05 | End: 2024-06-05 | Stop reason: HOSPADM

## 2024-06-05 RX ORDER — LEVOFLOXACIN 750 MG/1
750 TABLET, FILM COATED ORAL DAILY
Qty: 10 TABLET | Refills: 0 | Status: SHIPPED | OUTPATIENT
Start: 2024-06-05 | End: 2024-06-15

## 2024-06-05 RX ADMIN — REGADENOSON 0.4 MG: 0.08 INJECTION, SOLUTION INTRAVENOUS at 09:50

## 2024-06-05 RX ADMIN — SODIUM CHLORIDE, PRESERVATIVE FREE 10 ML: 5 INJECTION INTRAVENOUS at 08:41

## 2024-06-05 RX ADMIN — ASPIRIN 81 MG: 81 TABLET, COATED ORAL at 08:41

## 2024-06-05 RX ADMIN — ENOXAPARIN SODIUM 40 MG: 100 INJECTION SUBCUTANEOUS at 08:41

## 2024-06-05 RX ADMIN — TETROFOSMIN 30.5 MILLICURIE: 1.38 INJECTION, POWDER, LYOPHILIZED, FOR SOLUTION INTRAVENOUS at 09:55

## 2024-06-05 NOTE — PLAN OF CARE
Problem: Discharge Planning  Goal: Discharge to home or other facility with appropriate resources  6/5/2024 1154 by Fozia Avila RN  Outcome: Progressing  6/5/2024 0202 by Yessy Avalos RN  Outcome: Progressing     Problem: Pain  Goal: Verbalizes/displays adequate comfort level or baseline comfort level  6/5/2024 1154 by Fozia Avila RN  Outcome: Progressing  6/5/2024 0202 by Yessy Avalos RN  Outcome: Progressing     Problem: Respiratory - Adult  Goal: Achieves optimal ventilation and oxygenation  6/5/2024 1154 by Fozia Avila RN  Outcome: Progressing  6/5/2024 0202 by Yessy Avalos RN  Outcome: Progressing     Problem: Cardiovascular - Adult  Goal: Maintains optimal cardiac output and hemodynamic stability  6/5/2024 1154 by Fozia Avila RN  Outcome: Progressing  6/5/2024 0202 by Yessy Avalos RN  Outcome: Progressing     Problem: Infection - Adult  Goal: Absence of infection at discharge  6/5/2024 1154 by Fozia Avila RN  Outcome: Progressing  6/5/2024 0202 by Yessy Avalos RN  Outcome: Progressing     Problem: Metabolic/Fluid and Electrolytes - Adult  Goal: Electrolytes maintained within normal limits  6/5/2024 1154 by Fozia Avila RN  Outcome: Progressing  6/5/2024 0202 by Yessy Avalos RN  Outcome: Progressing  Goal: Hemodynamic stability and optimal renal function maintained  6/5/2024 1154 by Fozia Avila RN  Outcome: Progressing  6/5/2024 0202 by Yessy Avalos RN  Outcome: Progressing  Goal: Glucose maintained within prescribed range  6/5/2024 1154 by Fozia Avila RN  Outcome: Progressing  6/5/2024 0202 by Yessy Avalos RN  Outcome: Progressing     Problem: Hematologic - Adult  Goal: Maintains hematologic stability  6/5/2024 1154 by Fozia Avila RN  Outcome: Progressing  6/5/2024 0202 by Yessy Avalos RN  Outcome: Progressing     Problem: ABCDS Injury Assessment  Goal: Absence of physical injury  Outcome: Progressing

## 2024-06-05 NOTE — PROGRESS NOTES
Pt A&Ox4. VSS, on room air. Pt c/o chest pain/ tightness and rate 7/10. Pt here for chest pain poss stress test in the morning. Alejandrina Matute NP, notified. See new order.

## 2024-06-05 NOTE — DISCHARGE SUMMARY
Hospitalist Discharge Summary     Clyde Salgado  : 1955  MRN: [id number]    Admit date: 6/3/2024  Discharge date: 2024    Admitting Physician: Urban Alarcon MD    Discharge Diagnoses:   Patient Active Problem List   Diagnosis    Paresthesia of right arm and leg    SOB (shortness of breath)    Bronchospasm    Pulmonary nodules    Uncomplicated asthma    Pneumonia due to infectious agent    Sepsis (HCC)    Tachycardia    Tachypnea    Neutrophilic leukocytosis    Acquired hypothyroidism    Hyperlipidemia    DMII (diabetes mellitus, type 2) (Former[facility])       Admission Condition: fair    Discharged Condition: stable    Discharge Exam:  VITALS:  /75   Pulse 72   Temp 98.3 °F (36.8 °C) (Oral)   Resp 16   Ht 1.676 m (5' 6\")   Wt 80.3 kg (177 lb 0.5 oz)   SpO2 93%   BMI 28.57 kg/m²   CONSTITUTIONAL:  awake, alert, cooperative, no apparent distress, and appears stated age  EYES:  Lids and lashes normal, PERRL, EOMI, sclera clear, conjunctiva normal  ENT:  NC/AT, MMM    NECK:  Supple, symmetrical, trachea midline, no adenopathy  HEMATOLOGIC/LYMPHATICS:  no cervical, supraclavicular or axillary lymphadenopathy  LUNGS:  clear to auscultation bilaterally, No increased work of breathing, good air exchange, no crackles or wheezing  CARDIOVASCULAR:  Regular rate and rhythm, normal S1 and S2, no S3 or S4, and no significant murmurs, rubs or gallops noted. Normal apical impulse,   ABDOMEN:  Normal active bowel sounds, soft, non-tender, non-distended, no masses palpated, no organomegally  MUSCULOSKELETAL:  Full range of motion noted.     NEUROLOGIC:  Awake, alert, oriented to name, place and time.  Cranial nerves II-XII are grossly intact.    SKIN:  normal skin color, texture, turgor for age.    Hospital Course:     Chief Complaint on Admission: Shortness of breath, chest pain   Chief diagnosis after evaluation: Pneumonia     Brief Synopsis: Patient is a 68 y.o. man who has a past medical history of Acquired

## 2024-06-05 NOTE — PLAN OF CARE
Problem: Discharge Planning  Goal: Discharge to home or other facility with appropriate resources  Outcome: Progressing     Problem: Pain  Goal: Verbalizes/displays adequate comfort level or baseline comfort level  Outcome: Progressing     Problem: Respiratory - Adult  Goal: Achieves optimal ventilation and oxygenation  Outcome: Progressing     Problem: Cardiovascular - Adult  Goal: Maintains optimal cardiac output and hemodynamic stability  Outcome: Progressing     Problem: Infection - Adult  Goal: Absence of infection at discharge  Outcome: Progressing     Problem: Metabolic/Fluid and Electrolytes - Adult  Goal: Electrolytes maintained within normal limits  Outcome: Progressing  Goal: Hemodynamic stability and optimal renal function maintained  Outcome: Progressing  Goal: Glucose maintained within prescribed range  Outcome: Progressing     Problem: Hematologic - Adult  Goal: Maintains hematologic stability  Outcome: Progressing

## 2024-06-05 NOTE — DISCHARGE INSTRUCTIONS
Follow up with your regular physician for monitoring of Pulmonary Nodules if not already done (has appeared on previous studies)    Pulmonary nodules  - Incidental findings on CTPE 06/03/2024 (Epic does document a h/o lung nodules)  Multiple pulmonary nodules. Most significant: Right ground-glass pulmonary  nodule within the upper lobe measuring 5 mm. Per Fleischner Society  Guidelines, recommend a non-contrast Chest CT at 3-6 months. If stable,  consider follow-up non-contrast Chest CTs at 2 and 4 years.

## 2024-06-05 NOTE — PROGRESS NOTES
Discharge instructions and medications reviewed with patient.  Pt. Verbalized understanding. Denies questions.  Discontinued IV without complications.  Pt. tolerated well.    Electronically signed by Fozia Salguero RN on 6/5/2024 at 2:51 PM

## 2024-06-05 NOTE — DISCHARGE INSTR - COC
Continuity of Care Form    Patient Name: Clyde Salgado   :  1955  MRN:  [id number]    Admit date:  6/3/2024  Discharge date:  ***    Code Status Order: Full Code   Advance Directives:     Admitting Physician:  Urban Alarcon MD  PCP: Marnie Pablo MD    Discharging Nurse: ***  Discharging Hospital Unit/Room#: U8H-3360/4258-01  Discharging Unit Phone Number: ***    Emergency Contact:   Extended Emergency Contact Information  Primary Emergency Contact: Tawnya Salgado  Address: [address] [place] [address]  Home Phone: [phone]  Work Phone: [phone]  Relation: Spouse  Secondary Emergency Contact: Hussein Salgado  Home Phone: [phone]  Relation: Child    Past Surgical History:  Past Surgical History:   Procedure Laterality Date    ROTATOR CUFF REPAIR         Immunization History:   There is no immunization history for the selected administration types on file for this patient.    Active Problems:  Patient Active Problem List   Diagnosis Code    Paresthesia of right arm and leg R20.2    SOB (shortness of breath) R06.02    Bronchospasm J98.01    Pulmonary nodules R91.8    Uncomplicated asthma J45.909    Pneumonia due to infectious agent J18.9    Sepsis ([facility]) A41.9    Tachycardia R00.0    Tachypnea R06.82    Neutrophilic leukocytosis D72.9    Acquired hypothyroidism E03.9    Hyperlipidemia E78.5    DMII (diabetes mellitus, type 2) ([facility]) E11.9       Isolation/Infection:   Isolation            No Isolation          Patient Infection Status       None to display                     Nurse Assessment:  Last Vital Signs: /86   Pulse 65   Temp 98.1 °F (36.7 °C) (Oral)   Resp 16   Ht 1.676 m (5' 6\")   Wt 80.3 kg (177 lb 0.5 oz)   SpO2 93%   BMI 28.57 kg/m²     Last documented pain score (0-10 scale): Pain Level: 0  Last Weight:   Wt Readings from Last 1 Encounters:   24 80.3 kg (177 lb 0.5 oz)     Mental Status:  {IP PT MENTAL STATUS:87432}    IV

## 2024-06-05 NOTE — CARE COORDINATION
CASE MANAGEMENT DISCHARGE SUMMARY: Discharge order noted. Patient discharging to home. No needs identified.     DISCHARGE DATE: 6/5/24    DISCHARGED TO HOME     TRANSPORTATION: Family             TIME: Afternoon     PREFERRED PHARMACY: [facility]       Zoila BOJORQUEZ, RN  Case Management  [phone]             Electronically signed by Zoila Thomas RN on 6/5/2024 at 2:51 PM

## 2024-06-05 NOTE — PROGRESS NOTES
Pt resting in bed with no c/o pain at this time. Alert and oriented x4. VSS. Pt ambulating around room, up as tolerate. Bed in lowest and locked position, call light within reach, side rails up x2, bed alarm deferred. Pt denies any needs at this time.  Electronically signed by Fozia Salguero RN on 6/5/2024 at 11:55 AM

## 2024-06-05 NOTE — PLAN OF CARE
Problem: Discharge Planning  Goal: Discharge to home or other facility with appropriate resources  6/5/2024 1356 by Fozia Avila RN  Outcome: Adequate for Discharge  6/5/2024 1154 by Fozia Avila RN  Outcome: Progressing  6/5/2024 0202 by Yessy Avalos RN  Outcome: Progressing     Problem: Pain  Goal: Verbalizes/displays adequate comfort level or baseline comfort level  6/5/2024 1356 by Fozia Avila RN  Outcome: Adequate for Discharge  6/5/2024 1154 by Fozia Avila RN  Outcome: Progressing  6/5/2024 0202 by Yessy Avalos RN  Outcome: Progressing     Problem: Respiratory - Adult  Goal: Achieves optimal ventilation and oxygenation  6/5/2024 1356 by Fozia Avila RN  Outcome: Adequate for Discharge  6/5/2024 1154 by Fozia Avila RN  Outcome: Progressing  6/5/2024 0202 by Yessy Avalos RN  Outcome: Progressing     Problem: Cardiovascular - Adult  Goal: Maintains optimal cardiac output and hemodynamic stability  6/5/2024 1356 by Fozia Avila RN  Outcome: Adequate for Discharge  6/5/2024 1154 by Fozia Avila RN  Outcome: Progressing  6/5/2024 0202 by Yessy Avalos RN  Outcome: Progressing     Problem: Infection - Adult  Goal: Absence of infection at discharge  6/5/2024 1356 by Fozia Avila RN  Outcome: Adequate for Discharge  6/5/2024 1154 by Fozia Avila RN  Outcome: Progressing  6/5/2024 0202 by Yessy Avalos RN  Outcome: Progressing     Problem: Metabolic/Fluid and Electrolytes - Adult  Goal: Electrolytes maintained within normal limits  6/5/2024 1356 by Fozia Avila RN  Outcome: Adequate for Discharge  6/5/2024 1154 by Fozia Avila RN  Outcome: Progressing  6/5/2024 0202 by Yessy Avalos RN  Outcome: Progressing  Goal: Hemodynamic stability and optimal renal function maintained  6/5/2024 1356 by Fozia Avila RN  Outcome: Adequate for Discharge  6/5/2024 1154 by Fozia Avila RN  Outcome: Progressing  6/5/2024 0202

## 2024-06-07 LAB
BACTERIA BLD CULT ORG #2: NORMAL
BACTERIA BLD CULT: NORMAL

## 2024-06-30 ENCOUNTER — HOSPITAL ENCOUNTER (INPATIENT)
Age: 69
LOS: 2 days | Discharge: HOME | DRG: 871 | End: 2024-07-02
Attending: EMERGENCY MEDICINE | Admitting: HOSPITALIST
Payer: MEDICARE

## 2024-06-30 ENCOUNTER — APPOINTMENT (OUTPATIENT)
Dept: GENERAL RADIOLOGY | Age: 69
DRG: 871 | End: 2024-06-30
Payer: MEDICARE

## 2024-06-30 DIAGNOSIS — R09.02 HYPOXIA: ICD-10-CM

## 2024-06-30 DIAGNOSIS — J45.901 EXACERBATION OF ASTHMA, UNSPECIFIED ASTHMA SEVERITY, UNSPECIFIED WHETHER PERSISTENT: ICD-10-CM

## 2024-06-30 DIAGNOSIS — J18.9 PNEUMONIA OF BOTH LUNGS DUE TO INFECTIOUS ORGANISM, UNSPECIFIED PART OF LUNG: ICD-10-CM

## 2024-06-30 DIAGNOSIS — R07.9 CHEST PAIN, UNSPECIFIED TYPE: Primary | ICD-10-CM

## 2024-06-30 LAB
ALBUMIN SERPL-MCNC: 4 G/DL (ref 3.4–5)
ALBUMIN/GLOB SERPL: 1.3 {RATIO} (ref 1.1–2.2)
ALP SERPL-CCNC: 124 U/L (ref 40–129)
ALT SERPL-CCNC: 12 U/L (ref 10–40)
ANION GAP SERPL CALCULATED.3IONS-SCNC: 12 MMOL/L (ref 3–16)
AST SERPL-CCNC: 15 U/L (ref 15–37)
BASE EXCESS BLDV CALC-SCNC: -1.4 MMOL/L
BASE EXCESS BLDV CALC-SCNC: 1.1 MMOL/L
BASOPHILS # BLD: 0.1 K/UL (ref 0–0.2)
BASOPHILS NFR BLD: 0.4 %
BILIRUB SERPL-MCNC: 0.3 MG/DL (ref 0–1)
BUN SERPL-MCNC: 9 MG/DL (ref 7–20)
CALCIUM SERPL-MCNC: 8.9 MG/DL (ref 8.3–10.6)
CHLORIDE SERPL-SCNC: 104 MMOL/L (ref 99–110)
CO2 BLDV-SCNC: 27 MMOL/L
CO2 BLDV-SCNC: 29 MMOL/L
CO2 SERPL-SCNC: 24 MMOL/L (ref 21–32)
COHGB MFR BLDV: 1.3 %
COHGB MFR BLDV: 1.6 %
CREAT SERPL-MCNC: 0.7 MG/DL (ref 0.8–1.3)
D-DIMER QUANTITATIVE: 0.57 UG/ML FEU (ref 0–0.6)
DEPRECATED RDW RBC AUTO: 15.6 % (ref 12.4–15.4)
EKG ATRIAL RATE: 98 BPM
EKG DIAGNOSIS: NORMAL
EKG P AXIS: 39 DEGREES
EKG P-R INTERVAL: 138 MS
EKG Q-T INTERVAL: 340 MS
EKG QRS DURATION: 80 MS
EKG QTC CALCULATION (BAZETT): 434 MS
EKG R AXIS: -13 DEGREES
EKG T AXIS: 40 DEGREES
EKG VENTRICULAR RATE: 98 BPM
EOSINOPHIL # BLD: 0.5 K/UL (ref 0–0.6)
EOSINOPHIL NFR BLD: 3.3 %
GFR SERPLBLD CREATININE-BSD FMLA CKD-EPI: >90 ML/MIN/{1.73_M2}
GLUCOSE BLD-MCNC: 154 MG/DL (ref 70–99)
GLUCOSE BLD-MCNC: 178 MG/DL (ref 70–99)
GLUCOSE BLD-MCNC: 237 MG/DL (ref 70–99)
GLUCOSE SERPL-MCNC: 155 MG/DL (ref 70–99)
HCO3 BLDV-SCNC: 26 MMOL/L (ref 23–29)
HCO3 BLDV-SCNC: 28 MMOL/L (ref 23–29)
HCT VFR BLD AUTO: 37.7 % (ref 40.5–52.5)
HGB BLD-MCNC: 12.7 G/DL (ref 13.5–17.5)
LACTATE BLDV-SCNC: 1.6 MMOL/L (ref 0.4–1.9)
LACTATE BLDV-SCNC: 1.7 MMOL/L (ref 0.4–2)
LACTATE BLDV-SCNC: 2 MMOL/L (ref 0.4–1.9)
LIPASE SERPL-CCNC: 25 U/L (ref 13–60)
LYMPHOCYTES # BLD: 1.9 K/UL (ref 1–5.1)
LYMPHOCYTES NFR BLD: 13.2 %
MCH RBC QN AUTO: 27.8 PG (ref 26–34)
MCHC RBC AUTO-ENTMCNC: 33.7 G/DL (ref 31–36)
MCV RBC AUTO: 82.3 FL (ref 80–100)
METHGB MFR BLDV: 0.3 %
METHGB MFR BLDV: 0.5 %
MONOCYTES # BLD: 0.7 K/UL (ref 0–1.3)
MONOCYTES NFR BLD: 5.3 %
NEUTROPHILS # BLD: 10.9 K/UL (ref 1.7–7.7)
NEUTROPHILS NFR BLD: 77.8 %
NT-PROBNP SERPL-MCNC: <36 PG/ML (ref 0–124)
O2 THERAPY: ABNORMAL
O2 THERAPY: ABNORMAL
PCO2 BLDV: 50.6 MMHG (ref 40–50)
PCO2 BLDV: 51 MMHG (ref 40–50)
PERFORMED ON: ABNORMAL
PH BLDV: 7.31 [PH] (ref 7.35–7.45)
PH BLDV: 7.34 [PH] (ref 7.35–7.45)
PLATELET # BLD AUTO: 241 K/UL (ref 135–450)
PMV BLD AUTO: 7.7 FL (ref 5–10.5)
PO2 BLDV: 42 MMHG
PO2 BLDV: 98 MMHG
POTASSIUM SERPL-SCNC: 4.1 MMOL/L (ref 3.5–5.1)
PROCALCITONIN SERPL IA-MCNC: 0.05 NG/ML (ref 0–0.15)
PROT SERPL-MCNC: 7 G/DL (ref 6.4–8.2)
RBC # BLD AUTO: 4.59 M/UL (ref 4.2–5.9)
SAO2 % BLDV: 72 %
SAO2 % BLDV: 98 %
SARS-COV-2 RDRP RESP QL NAA+PROBE: NOT DETECTED
SODIUM SERPL-SCNC: 140 MMOL/L (ref 136–145)
TROPONIN, HIGH SENSITIVITY: 11 NG/L (ref 0–22)
TROPONIN, HIGH SENSITIVITY: 13 NG/L (ref 0–22)
WBC # BLD AUTO: 14 K/UL (ref 4–11)

## 2024-06-30 PROCEDURE — 85025 COMPLETE CBC W/AUTO DIFF WBC: CPT

## 2024-06-30 PROCEDURE — 5A09357 ASSISTANCE WITH RESPIRATORY VENTILATION, LESS THAN 24 CONSECUTIVE HOURS, CONTINUOUS POSITIVE AIRWAY PRESSURE: ICD-10-PCS | Performed by: STUDENT IN AN ORGANIZED HEALTH CARE EDUCATION/TRAINING PROGRAM

## 2024-06-30 PROCEDURE — 2580000003 HC RX 258: Performed by: EMERGENCY MEDICINE

## 2024-06-30 PROCEDURE — 83690 ASSAY OF LIPASE: CPT

## 2024-06-30 PROCEDURE — 83605 ASSAY OF LACTIC ACID: CPT

## 2024-06-30 PROCEDURE — 80053 COMPREHEN METABOLIC PANEL: CPT

## 2024-06-30 PROCEDURE — 6360000002 HC RX W HCPCS: Performed by: HOSPITALIST

## 2024-06-30 PROCEDURE — 99223 1ST HOSP IP/OBS HIGH 75: CPT | Performed by: INTERNAL MEDICINE

## 2024-06-30 PROCEDURE — 94640 AIRWAY INHALATION TREATMENT: CPT

## 2024-06-30 PROCEDURE — 2060000000 HC ICU INTERMEDIATE R&B

## 2024-06-30 PROCEDURE — 87635 SARS-COV-2 COVID-19 AMP PRB: CPT

## 2024-06-30 PROCEDURE — 5A0935A ASSISTANCE WITH RESPIRATORY VENTILATION, LESS THAN 24 CONSECUTIVE HOURS, HIGH NASAL FLOW/VELOCITY: ICD-10-PCS | Performed by: STUDENT IN AN ORGANIZED HEALTH CARE EDUCATION/TRAINING PROGRAM

## 2024-06-30 PROCEDURE — 71045 X-RAY EXAM CHEST 1 VIEW: CPT

## 2024-06-30 PROCEDURE — 93010 ELECTROCARDIOGRAM REPORT: CPT | Performed by: INTERNAL MEDICINE

## 2024-06-30 PROCEDURE — 82803 BLOOD GASES ANY COMBINATION: CPT

## 2024-06-30 PROCEDURE — 36415 COLL VENOUS BLD VENIPUNCTURE: CPT

## 2024-06-30 PROCEDURE — 94660 CPAP INITIATION&MGMT: CPT

## 2024-06-30 PROCEDURE — 6370000000 HC RX 637 (ALT 250 FOR IP): Performed by: EMERGENCY MEDICINE

## 2024-06-30 PROCEDURE — 83880 ASSAY OF NATRIURETIC PEPTIDE: CPT

## 2024-06-30 PROCEDURE — 87040 BLOOD CULTURE FOR BACTERIA: CPT

## 2024-06-30 PROCEDURE — 6370000000 HC RX 637 (ALT 250 FOR IP): Performed by: HOSPITALIST

## 2024-06-30 PROCEDURE — 2580000003 HC RX 258: Performed by: STUDENT IN AN ORGANIZED HEALTH CARE EDUCATION/TRAINING PROGRAM

## 2024-06-30 PROCEDURE — 85379 FIBRIN DEGRADATION QUANT: CPT

## 2024-06-30 PROCEDURE — 2700000000 HC OXYGEN THERAPY PER DAY

## 2024-06-30 PROCEDURE — 6360000002 HC RX W HCPCS: Performed by: EMERGENCY MEDICINE

## 2024-06-30 PROCEDURE — 84145 PROCALCITONIN (PCT): CPT

## 2024-06-30 PROCEDURE — 96374 THER/PROPH/DIAG INJ IV PUSH: CPT

## 2024-06-30 PROCEDURE — 84484 ASSAY OF TROPONIN QUANT: CPT

## 2024-06-30 PROCEDURE — 2580000003 HC RX 258: Performed by: HOSPITALIST

## 2024-06-30 PROCEDURE — 93005 ELECTROCARDIOGRAM TRACING: CPT | Performed by: EMERGENCY MEDICINE

## 2024-06-30 PROCEDURE — 94761 N-INVAS EAR/PLS OXIMETRY MLT: CPT

## 2024-06-30 PROCEDURE — 96375 TX/PRO/DX INJ NEW DRUG ADDON: CPT

## 2024-06-30 PROCEDURE — 6370000000 HC RX 637 (ALT 250 FOR IP): Performed by: INTERNAL MEDICINE

## 2024-06-30 PROCEDURE — 99285 EMERGENCY DEPT VISIT HI MDM: CPT

## 2024-06-30 RX ORDER — INSULIN LISPRO 100 [IU]/ML
0-4 INJECTION, SOLUTION INTRAVENOUS; SUBCUTANEOUS NIGHTLY
Status: DISCONTINUED | OUTPATIENT
Start: 2024-06-30 | End: 2024-07-02 | Stop reason: HOSPADM

## 2024-06-30 RX ORDER — PREDNISONE 20 MG/1
40 TABLET ORAL DAILY
Status: DISCONTINUED | OUTPATIENT
Start: 2024-06-30 | End: 2024-07-02 | Stop reason: HOSPADM

## 2024-06-30 RX ORDER — ENOXAPARIN SODIUM 100 MG/ML
40 INJECTION SUBCUTANEOUS NIGHTLY
Status: DISCONTINUED | OUTPATIENT
Start: 2024-06-30 | End: 2024-07-02 | Stop reason: HOSPADM

## 2024-06-30 RX ORDER — ACETAMINOPHEN 325 MG/1
650 TABLET ORAL EVERY 6 HOURS PRN
Status: DISCONTINUED | OUTPATIENT
Start: 2024-06-30 | End: 2024-06-30 | Stop reason: SDUPTHER

## 2024-06-30 RX ORDER — DEXTROSE MONOHYDRATE 100 MG/ML
INJECTION, SOLUTION INTRAVENOUS CONTINUOUS PRN
Status: DISCONTINUED | OUTPATIENT
Start: 2024-06-30 | End: 2024-07-02 | Stop reason: HOSPADM

## 2024-06-30 RX ORDER — SODIUM CHLORIDE 9 MG/ML
INJECTION, SOLUTION INTRAVENOUS PRN
Status: DISCONTINUED | OUTPATIENT
Start: 2024-06-30 | End: 2024-07-02 | Stop reason: HOSPADM

## 2024-06-30 RX ORDER — POLYETHYLENE GLYCOL 3350 17 G/17G
17 POWDER, FOR SOLUTION ORAL DAILY PRN
Status: DISCONTINUED | OUTPATIENT
Start: 2024-06-30 | End: 2024-07-02 | Stop reason: HOSPADM

## 2024-06-30 RX ORDER — SODIUM CHLORIDE 0.9 % (FLUSH) 0.9 %
5-40 SYRINGE (ML) INJECTION EVERY 12 HOURS SCHEDULED
Status: DISCONTINUED | OUTPATIENT
Start: 2024-06-30 | End: 2024-07-02 | Stop reason: HOSPADM

## 2024-06-30 RX ORDER — SODIUM CHLORIDE 0.9 % (FLUSH) 0.9 %
10 SYRINGE (ML) INJECTION PRN
Status: DISCONTINUED | OUTPATIENT
Start: 2024-06-30 | End: 2024-07-02 | Stop reason: HOSPADM

## 2024-06-30 RX ORDER — ATORVASTATIN CALCIUM 40 MG/1
40 TABLET, FILM COATED ORAL NIGHTLY
Status: DISCONTINUED | OUTPATIENT
Start: 2024-06-30 | End: 2024-07-02 | Stop reason: HOSPADM

## 2024-06-30 RX ORDER — PANTOPRAZOLE SODIUM 40 MG/1
40 TABLET, DELAYED RELEASE ORAL
Status: DISCONTINUED | OUTPATIENT
Start: 2024-07-01 | End: 2024-07-02 | Stop reason: HOSPADM

## 2024-06-30 RX ORDER — INSULIN LISPRO 100 [IU]/ML
0-8 INJECTION, SOLUTION INTRAVENOUS; SUBCUTANEOUS
Status: DISCONTINUED | OUTPATIENT
Start: 2024-06-30 | End: 2024-07-02 | Stop reason: HOSPADM

## 2024-06-30 RX ORDER — ALPRAZOLAM 0.5 MG/1
0.5 TABLET ORAL ONCE
Status: DISCONTINUED | OUTPATIENT
Start: 2024-06-30 | End: 2024-07-02 | Stop reason: HOSPADM

## 2024-06-30 RX ORDER — ENOXAPARIN SODIUM 100 MG/ML
40 INJECTION SUBCUTANEOUS DAILY
Status: DISCONTINUED | OUTPATIENT
Start: 2024-06-30 | End: 2024-06-30 | Stop reason: SDUPTHER

## 2024-06-30 RX ORDER — ACETAMINOPHEN 650 MG/1
650 SUPPOSITORY RECTAL EVERY 6 HOURS PRN
Status: DISCONTINUED | OUTPATIENT
Start: 2024-06-30 | End: 2024-07-02 | Stop reason: HOSPADM

## 2024-06-30 RX ORDER — LEVOTHYROXINE SODIUM 0.05 MG/1
50 TABLET ORAL
Status: DISCONTINUED | OUTPATIENT
Start: 2024-06-30 | End: 2024-07-02 | Stop reason: HOSPADM

## 2024-06-30 RX ORDER — ONDANSETRON 2 MG/ML
4 INJECTION INTRAMUSCULAR; INTRAVENOUS EVERY 6 HOURS PRN
Status: DISCONTINUED | OUTPATIENT
Start: 2024-06-30 | End: 2024-07-02 | Stop reason: HOSPADM

## 2024-06-30 RX ORDER — ERGOCALCIFEROL 1.25 MG/1
50000 CAPSULE ORAL WEEKLY
COMMUNITY

## 2024-06-30 RX ORDER — IPRATROPIUM BROMIDE AND ALBUTEROL SULFATE 2.5; .5 MG/3ML; MG/3ML
1 SOLUTION RESPIRATORY (INHALATION) EVERY 4 HOURS PRN
Status: DISCONTINUED | OUTPATIENT
Start: 2024-06-30 | End: 2024-07-02 | Stop reason: HOSPADM

## 2024-06-30 RX ORDER — NITROGLYCERIN 0.4 MG/1
0.4 TABLET SUBLINGUAL ONCE
Status: DISCONTINUED | OUTPATIENT
Start: 2024-06-30 | End: 2024-06-30

## 2024-06-30 RX ORDER — POTASSIUM CHLORIDE 7.45 MG/ML
10 INJECTION INTRAVENOUS PRN
Status: DISCONTINUED | OUTPATIENT
Start: 2024-06-30 | End: 2024-07-02 | Stop reason: HOSPADM

## 2024-06-30 RX ORDER — POTASSIUM CHLORIDE 20 MEQ/1
40 TABLET, EXTENDED RELEASE ORAL PRN
Status: DISCONTINUED | OUTPATIENT
Start: 2024-06-30 | End: 2024-07-02 | Stop reason: HOSPADM

## 2024-06-30 RX ORDER — 0.9 % SODIUM CHLORIDE 0.9 %
30 INTRAVENOUS SOLUTION INTRAVENOUS ONCE
Status: COMPLETED | OUTPATIENT
Start: 2024-06-30 | End: 2024-06-30

## 2024-06-30 RX ORDER — IPRATROPIUM BROMIDE AND ALBUTEROL SULFATE 2.5; .5 MG/3ML; MG/3ML
1 SOLUTION RESPIRATORY (INHALATION) ONCE
Status: COMPLETED | OUTPATIENT
Start: 2024-06-30 | End: 2024-06-30

## 2024-06-30 RX ORDER — GLUCAGON 1 MG/ML
1 KIT INJECTION PRN
Status: DISCONTINUED | OUTPATIENT
Start: 2024-06-30 | End: 2024-07-02 | Stop reason: HOSPADM

## 2024-06-30 RX ORDER — SODIUM CHLORIDE 0.9 % (FLUSH) 0.9 %
5-40 SYRINGE (ML) INJECTION PRN
Status: DISCONTINUED | OUTPATIENT
Start: 2024-06-30 | End: 2024-07-02 | Stop reason: HOSPADM

## 2024-06-30 RX ORDER — ONDANSETRON 4 MG/1
4 TABLET, ORALLY DISINTEGRATING ORAL EVERY 8 HOURS PRN
Status: DISCONTINUED | OUTPATIENT
Start: 2024-06-30 | End: 2024-07-02 | Stop reason: HOSPADM

## 2024-06-30 RX ORDER — PREDNISONE 20 MG/1
40 TABLET ORAL ONCE
Status: COMPLETED | OUTPATIENT
Start: 2024-06-30 | End: 2024-06-30

## 2024-06-30 RX ORDER — MORPHINE SULFATE 4 MG/ML
4 INJECTION, SOLUTION INTRAMUSCULAR; INTRAVENOUS ONCE
Status: COMPLETED | OUTPATIENT
Start: 2024-06-30 | End: 2024-06-30

## 2024-06-30 RX ORDER — ONDANSETRON 2 MG/ML
4 INJECTION INTRAMUSCULAR; INTRAVENOUS ONCE
Status: COMPLETED | OUTPATIENT
Start: 2024-06-30 | End: 2024-06-30

## 2024-06-30 RX ORDER — ASPIRIN 81 MG/1
81 TABLET ORAL DAILY
Status: DISCONTINUED | OUTPATIENT
Start: 2024-06-30 | End: 2024-07-02 | Stop reason: HOSPADM

## 2024-06-30 RX ORDER — ACETAMINOPHEN 650 MG/1
650 SUPPOSITORY RECTAL EVERY 6 HOURS PRN
Status: DISCONTINUED | OUTPATIENT
Start: 2024-06-30 | End: 2024-06-30 | Stop reason: SDUPTHER

## 2024-06-30 RX ORDER — ACETAMINOPHEN 325 MG/1
650 TABLET ORAL EVERY 6 HOURS PRN
Status: DISCONTINUED | OUTPATIENT
Start: 2024-06-30 | End: 2024-07-02 | Stop reason: HOSPADM

## 2024-06-30 RX ORDER — SODIUM CHLORIDE 9 MG/ML
INJECTION, SOLUTION INTRAVENOUS CONTINUOUS
Status: DISCONTINUED | OUTPATIENT
Start: 2024-06-30 | End: 2024-07-02 | Stop reason: HOSPADM

## 2024-06-30 RX ORDER — SODIUM CHLORIDE 9 MG/ML
INJECTION, SOLUTION INTRAVENOUS CONTINUOUS
Status: DISCONTINUED | OUTPATIENT
Start: 2024-06-30 | End: 2024-06-30

## 2024-06-30 RX ADMIN — ASPIRIN 81 MG: 81 TABLET, COATED ORAL at 10:24

## 2024-06-30 RX ADMIN — CEFEPIME 2000 MG: 2 INJECTION, POWDER, FOR SOLUTION INTRAVENOUS at 21:34

## 2024-06-30 RX ADMIN — CEFEPIME 2000 MG: 2 INJECTION, POWDER, FOR SOLUTION INTRAVENOUS at 11:28

## 2024-06-30 RX ADMIN — MORPHINE SULFATE 4 MG: 4 INJECTION, SOLUTION INTRAMUSCULAR; INTRAVENOUS at 06:45

## 2024-06-30 RX ADMIN — IPRATROPIUM BROMIDE AND ALBUTEROL SULFATE 1 DOSE: .5; 3 SOLUTION RESPIRATORY (INHALATION) at 05:03

## 2024-06-30 RX ADMIN — SODIUM CHLORIDE: 9 INJECTION, SOLUTION INTRAVENOUS at 10:26

## 2024-06-30 RX ADMIN — PREDNISONE 40 MG: 20 TABLET ORAL at 06:31

## 2024-06-30 RX ADMIN — ENOXAPARIN SODIUM 40 MG: 100 INJECTION SUBCUTANEOUS at 21:34

## 2024-06-30 RX ADMIN — Medication 10 ML: at 10:26

## 2024-06-30 RX ADMIN — AZITHROMYCIN MONOHYDRATE 500 MG: 500 INJECTION, POWDER, LYOPHILIZED, FOR SOLUTION INTRAVENOUS at 06:37

## 2024-06-30 RX ADMIN — WATER 1000 MG: 1 INJECTION INTRAMUSCULAR; INTRAVENOUS; SUBCUTANEOUS at 06:31

## 2024-06-30 RX ADMIN — MORPHINE SULFATE 4 MG: 4 INJECTION, SOLUTION INTRAMUSCULAR; INTRAVENOUS at 04:48

## 2024-06-30 RX ADMIN — SODIUM CHLORIDE: 9 INJECTION, SOLUTION INTRAVENOUS at 09:32

## 2024-06-30 RX ADMIN — PREDNISONE 40 MG: 20 TABLET ORAL at 11:24

## 2024-06-30 RX ADMIN — SODIUM CHLORIDE: 9 INJECTION, SOLUTION INTRAVENOUS at 18:49

## 2024-06-30 RX ADMIN — ATORVASTATIN CALCIUM 40 MG: 40 TABLET, FILM COATED ORAL at 21:35

## 2024-06-30 RX ADMIN — ONDANSETRON 4 MG: 2 INJECTION INTRAMUSCULAR; INTRAVENOUS at 04:48

## 2024-06-30 RX ADMIN — LEVOTHYROXINE SODIUM 50 MCG: 0.05 TABLET ORAL at 10:24

## 2024-06-30 RX ADMIN — SODIUM CHLORIDE 1845 ML: 9 INJECTION, SOLUTION INTRAVENOUS at 06:38

## 2024-06-30 RX ADMIN — SODIUM CHLORIDE, PRESERVATIVE FREE 10 ML: 5 INJECTION INTRAVENOUS at 10:08

## 2024-06-30 ASSESSMENT — PAIN DESCRIPTION - LOCATION
LOCATION: CHEST

## 2024-06-30 ASSESSMENT — PAIN DESCRIPTION - ORIENTATION: ORIENTATION: MID

## 2024-06-30 ASSESSMENT — PAIN DESCRIPTION - DESCRIPTORS
DESCRIPTORS: ACHING
DESCRIPTORS: CRUSHING;PRESSURE
DESCRIPTORS: CRUSHING;SHARP

## 2024-06-30 ASSESSMENT — PAIN - FUNCTIONAL ASSESSMENT
PAIN_FUNCTIONAL_ASSESSMENT: ACTIVITIES ARE NOT PREVENTED
PAIN_FUNCTIONAL_ASSESSMENT: ACTIVITIES ARE NOT PREVENTED
PAIN_FUNCTIONAL_ASSESSMENT: 0-10

## 2024-06-30 ASSESSMENT — PAIN DESCRIPTION - FREQUENCY: FREQUENCY: CONTINUOUS

## 2024-06-30 ASSESSMENT — PAIN SCALES - GENERAL
PAINLEVEL_OUTOF10: 8
PAINLEVEL_OUTOF10: 9
PAINLEVEL_OUTOF10: 6

## 2024-06-30 ASSESSMENT — LIFESTYLE VARIABLES
HOW OFTEN DO YOU HAVE A DRINK CONTAINING ALCOHOL: 2-4 TIMES A MONTH
HOW MANY STANDARD DRINKS CONTAINING ALCOHOL DO YOU HAVE ON A TYPICAL DAY: 1 OR 2

## 2024-06-30 ASSESSMENT — PAIN DESCRIPTION - PAIN TYPE: TYPE: ACUTE PAIN

## 2024-06-30 NOTE — PLAN OF CARE
Admitted for sepsis 2/2 PNA and Acute hypoxic respiratory failure.  Will need to be seen by day team and H&P will need to be completed.

## 2024-06-30 NOTE — ED NOTES
ED tech was successful in collecting two sets of blood cultures on patient, blood cultures site were properly cleaned and cultures were obtained from  the right and left ac. Patient tolerated lab draws very well.

## 2024-06-30 NOTE — ED PROVIDER NOTES
[facility] EMERGENCY DEPARTMENT    CHIEF COMPLAINT  Chest Pain (Pt c/o  crushing mid sternal chest pain that started around 0200. EMS stated they gave 3 Asprin and 2 nitro with no relief )       HISTORY OF PRESENT ILLNESS  Clyde Salgado is a 69 y.o. male with history of asthma, diabetes, hyperlipidemia who presents to the ED with chest pain.  Reports crushing mid substernal chest pain that started at 2 AM.  Reports associated shortness of breath, diaphoresis, vomiting.  Pain is nonradiating.  Denies leg swelling or history of DVT/PE.  Denies increase in pain with inspiration.  EMS gave 3 aspirin and 2 nitroglycerin without relief.  History of admission with similar symptoms earlier this week. States he feels like he did when he had pneumonia.     I have reviewed the following from the nursing documentation:    Past Medical History:   Diagnosis Date    Acquired hypothyroidism 2024    DMII (diabetes mellitus, type 2) (HCC) 2024    GERD (gastroesophageal reflux disease)     GERD    Hyperlipidemia     Movement disorder     L Rotator Cuff Repair    Pneumonia due to infectious agent 2024    Uncomplicated asthma 2021     Past Surgical History:   Procedure Laterality Date    ROTATOR CUFF REPAIR       Family History   Problem Relation Age of Onset    Cancer Mother     Heart Disease Father     High Cholesterol Father      Social History     Socioeconomic History    Marital status:      Spouse name: Not on file    Number of children: Not on file    Years of education: Not on file    Highest education level: Not on file   Occupational History    Not on file   Tobacco Use    Smoking status: Former     Current packs/day: 0.00     Types: Cigarettes     Quit date: 3/3/1981     Years since quittin.3    Smokeless tobacco: Never   Vaping Use    Vaping Use: Never used   Substance and Sexual Activity    Alcohol use: Yes     Alcohol/week: 2.0 standard drinks of alcohol     Types: 2

## 2024-06-30 NOTE — ED TRIAGE NOTES
Pt c/o  crushing mid sternal chest pain that started around 0200. EMS stated they gave 3 Asprin and 2 nitro with no relief

## 2024-06-30 NOTE — CONSULTS
ondansetron **OR** ondansetron, polyethylene glycol, acetaminophen **OR** acetaminophen, glucose, dextrose bolus **OR** dextrose bolus, glucagon (rDNA), dextrose    ALLERGIES:  Patient has No Known Allergies.    REVIEW OF SYSTEMS:  Constitutional: Negative for fever    HENT: Negative for sore throat  Eyes: Negative for redness   Respiratory: Negative for dyspnea, cough  Cardiovascular: Negative for chest pain  Gastrointestinal: Negative for vomiting, diarrhea    Genitourinary: Negative for hematuria   Musculoskeletal: Negative for arthralgias   Skin: Negative for rash  Neurological: Negative for syncope  Hematological: Negative for adenopathy  Psychiatric/Behavorial: Negative for anxiety    Objective:   PHYSICAL EXAM:  Blood pressure (!) 147/82, pulse (!) 109, temperature 99.5 °F (37.5 °C), temperature source Oral, resp. rate 20, height 1.651 m (5' 5\"), weight 83.7 kg (184 lb 8.4 oz), SpO2 98 %.'  Gen: No distress.    Eyes: PERRL. No sclera icterus. No conjunctival injection.   ENT: No discharge. Pharynx clear. External appearance of ears and nose normal.  Neck: Trachea midline. No obvious mass.    Resp: No accessory muscle use. No crackles. No wheezes. No rhonchi.    CV: Regular rate. Regular rhythm. No murmur or rub. No edema. Mild chest pain with chest pressure / tenderness to palpation   GI: Non-tender. Non-distended. No hernia.   Skin: Warm, dry, normal texture and turgor. No nodule on exposed extremities.   Lymph: No cervical LAD. No supraclavicular LAD.   M/S: No cyanosis. No clubbing. No joint deformity.    Neuro: Moves all four extremities.    Psych: Oriented x 3. No anxiety.  Awake. Alert. Intact judgement and insight.      Data Reviewed:   LABS:  CBC:   Recent Labs     06/30/24 0443   WBC 14.0*   HGB 12.7*   HCT 37.7*   MCV 82.3        BMP:   Recent Labs     06/30/24 0443      K 4.1      CO2 24   BUN 9   CREATININE 0.7*     LIVER PROFILE:   Recent Labs     06/30/24 0443   AST 15

## 2024-06-30 NOTE — ED NOTES
Report provided to Latricia MOBLEY , respiratory in route for transport to [facility] on bipap.

## 2024-06-30 NOTE — ED NOTES
Rn gave report to Carlie Celaya for the continuum of care.  all questions answered and ESBAR discussed.

## 2024-06-30 NOTE — H&P
procalcitonin  Continue patient on prednisone  Empiric antibiotics  DuoNeb      Pain management  R52  Continue with the IV and oral pain medication          DVT and GI prophylaxis      Full Code      TASHA FRANCES MD M.D    This note was transcribed using Dragon Dictation software. Please disregard any translational errors.

## 2024-07-01 ENCOUNTER — APPOINTMENT (OUTPATIENT)
Age: 69
DRG: 871 | End: 2024-07-01
Attending: HOSPITALIST
Payer: MEDICARE

## 2024-07-01 PROBLEM — J96.01 ACUTE RESPIRATORY FAILURE WITH HYPOXIA (HCC): Status: ACTIVE | Noted: 2024-07-01

## 2024-07-01 PROBLEM — R93.89 ABNORMAL CXR: Status: ACTIVE | Noted: 2024-07-01

## 2024-07-01 PROBLEM — J45.901 EXACERBATION OF ASTHMA: Status: ACTIVE | Noted: 2024-07-01

## 2024-07-01 PROBLEM — D72.18 EOSINOPHILIA IN DISEASES CLASSIFIED ELSEWHERE: Status: ACTIVE | Noted: 2024-07-01

## 2024-07-01 LAB
BASE EXCESS BLDV CALC-SCNC: 1.8 MMOL/L
BASOPHILS # BLD: 0 K/UL (ref 0–0.2)
BASOPHILS NFR BLD: 0.2 %
CO2 BLDV-SCNC: 29 MMOL/L
COHGB MFR BLDV: 4 %
DEPRECATED RDW RBC AUTO: 15.6 % (ref 12.4–15.4)
ECHO BSA: 1.95 M2
ECHO IVC EXP: 2 CM
ECHO LV FRACTIONAL SHORTENING: 33 % (ref 28–44)
ECHO LV INTERNAL DIMENSION DIASTOLE INDEX: 2.37 CM/M2
ECHO LV INTERNAL DIMENSION DIASTOLIC: 4.5 CM (ref 4.2–5.9)
ECHO LV INTERNAL DIMENSION SYSTOLIC INDEX: 1.58 CM/M2
ECHO LV INTERNAL DIMENSION SYSTOLIC: 3 CM
ECHO LV IVSD: 0.9 CM (ref 0.6–1)
ECHO LV MASS 2D: 142.9 G (ref 88–224)
ECHO LV MASS INDEX 2D: 75.2 G/M2 (ref 49–115)
ECHO LV POSTERIOR WALL DIASTOLIC: 1 CM (ref 0.6–1)
ECHO LV RELATIVE WALL THICKNESS RATIO: 0.44
ECHO RV BASAL DIMENSION: 3.7 CM
ECHO RV MID DIMENSION: 2.5 CM
ECHO RV TAPSE: 2.6 CM (ref 1.7–?)
EOSINOPHIL # BLD: 0 K/UL (ref 0–0.6)
EOSINOPHIL NFR BLD: 0.3 %
GLUCOSE BLD-MCNC: 103 MG/DL (ref 70–99)
GLUCOSE BLD-MCNC: 114 MG/DL (ref 70–99)
GLUCOSE BLD-MCNC: 197 MG/DL (ref 70–99)
HCO3 BLDV-SCNC: 28 MMOL/L (ref 23–29)
HCT VFR BLD AUTO: 33.6 % (ref 40.5–52.5)
HGB BLD-MCNC: 11.1 G/DL (ref 13.5–17.5)
LYMPHOCYTES # BLD: 1.2 K/UL (ref 1–5.1)
LYMPHOCYTES NFR BLD: 9.4 %
MCH RBC QN AUTO: 27.3 PG (ref 26–34)
MCHC RBC AUTO-ENTMCNC: 32.9 G/DL (ref 31–36)
MCV RBC AUTO: 83 FL (ref 80–100)
METHGB MFR BLDV: 1.8 %
MONOCYTES # BLD: 0.9 K/UL (ref 0–1.3)
MONOCYTES NFR BLD: 7 %
NEUTROPHILS # BLD: 11 K/UL (ref 1.7–7.7)
NEUTROPHILS NFR BLD: 83.1 %
O2 THERAPY: NORMAL
PCO2 BLDV: 47 MMHG (ref 40–50)
PERFORMED ON: ABNORMAL
PH BLDV: 7.38 [PH] (ref 7.35–7.45)
PLATELET # BLD AUTO: 186 K/UL (ref 135–450)
PMV BLD AUTO: 8.1 FL (ref 5–10.5)
PO2 BLDV: 77 MMHG
PROCALCITONIN SERPL IA-MCNC: 0.35 NG/ML (ref 0–0.15)
RBC # BLD AUTO: 4.05 M/UL (ref 4.2–5.9)
SAO2 % BLDV: 94 %
WBC # BLD AUTO: 13.2 K/UL (ref 4–11)

## 2024-07-01 PROCEDURE — 85025 COMPLETE CBC W/AUTO DIFF WBC: CPT

## 2024-07-01 PROCEDURE — 82803 BLOOD GASES ANY COMBINATION: CPT

## 2024-07-01 PROCEDURE — 2700000000 HC OXYGEN THERAPY PER DAY

## 2024-07-01 PROCEDURE — 36415 COLL VENOUS BLD VENIPUNCTURE: CPT

## 2024-07-01 PROCEDURE — 6370000000 HC RX 637 (ALT 250 FOR IP): Performed by: HOSPITALIST

## 2024-07-01 PROCEDURE — 97165 OT EVAL LOW COMPLEX 30 MIN: CPT

## 2024-07-01 PROCEDURE — 93325 DOPPLER ECHO COLOR FLOW MAPG: CPT | Performed by: INTERNAL MEDICINE

## 2024-07-01 PROCEDURE — 97530 THERAPEUTIC ACTIVITIES: CPT

## 2024-07-01 PROCEDURE — 86003 ALLG SPEC IGE CRUDE XTRC EA: CPT

## 2024-07-01 PROCEDURE — 94640 AIRWAY INHALATION TREATMENT: CPT

## 2024-07-01 PROCEDURE — 97116 GAIT TRAINING THERAPY: CPT | Performed by: PHYSICAL THERAPIST

## 2024-07-01 PROCEDURE — 94761 N-INVAS EAR/PLS OXIMETRY MLT: CPT

## 2024-07-01 PROCEDURE — 2060000000 HC ICU INTERMEDIATE R&B

## 2024-07-01 PROCEDURE — 6360000002 HC RX W HCPCS: Performed by: HOSPITALIST

## 2024-07-01 PROCEDURE — 94660 CPAP INITIATION&MGMT: CPT

## 2024-07-01 PROCEDURE — 99233 SBSQ HOSP IP/OBS HIGH 50: CPT | Performed by: INTERNAL MEDICINE

## 2024-07-01 PROCEDURE — 2580000003 HC RX 258: Performed by: HOSPITALIST

## 2024-07-01 PROCEDURE — 97161 PT EVAL LOW COMPLEX 20 MIN: CPT | Performed by: PHYSICAL THERAPIST

## 2024-07-01 PROCEDURE — 84145 PROCALCITONIN (PCT): CPT

## 2024-07-01 PROCEDURE — 93308 TTE F-UP OR LMTD: CPT

## 2024-07-01 PROCEDURE — 82785 ASSAY OF IGE: CPT

## 2024-07-01 PROCEDURE — 6370000000 HC RX 637 (ALT 250 FOR IP): Performed by: INTERNAL MEDICINE

## 2024-07-01 PROCEDURE — 93308 TTE F-UP OR LMTD: CPT | Performed by: INTERNAL MEDICINE

## 2024-07-01 RX ORDER — BUDESONIDE AND FORMOTEROL FUMARATE DIHYDRATE 160; 4.5 UG/1; UG/1
2 AEROSOL RESPIRATORY (INHALATION)
Status: DISCONTINUED | OUTPATIENT
Start: 2024-07-01 | End: 2024-07-02 | Stop reason: HOSPADM

## 2024-07-01 RX ORDER — CODEINE PHOSPHATE AND GUAIFENESIN 10; 100 MG/5ML; MG/5ML
5 SOLUTION ORAL EVERY 4 HOURS PRN
Status: DISCONTINUED | OUTPATIENT
Start: 2024-07-01 | End: 2024-07-02 | Stop reason: HOSPADM

## 2024-07-01 RX ORDER — IPRATROPIUM BROMIDE AND ALBUTEROL SULFATE 2.5; .5 MG/3ML; MG/3ML
1 SOLUTION RESPIRATORY (INHALATION)
Status: DISCONTINUED | OUTPATIENT
Start: 2024-07-01 | End: 2024-07-02 | Stop reason: HOSPADM

## 2024-07-01 RX ADMIN — PANTOPRAZOLE SODIUM 40 MG: 40 TABLET, DELAYED RELEASE ORAL at 06:01

## 2024-07-01 RX ADMIN — CEFEPIME 2000 MG: 2 INJECTION, POWDER, FOR SOLUTION INTRAVENOUS at 06:01

## 2024-07-01 RX ADMIN — IPRATROPIUM BROMIDE AND ALBUTEROL SULFATE 1 DOSE: 2.5; .5 SOLUTION RESPIRATORY (INHALATION) at 16:22

## 2024-07-01 RX ADMIN — GUAIFENESIN AND CODEINE PHOSPHATE 5 ML: 100; 10 SOLUTION ORAL at 20:37

## 2024-07-01 RX ADMIN — ENOXAPARIN SODIUM 40 MG: 100 INJECTION SUBCUTANEOUS at 20:37

## 2024-07-01 RX ADMIN — Medication 2 PUFF: at 20:19

## 2024-07-01 RX ADMIN — GUAIFENESIN AND CODEINE PHOSPHATE 5 ML: 100; 10 SOLUTION ORAL at 15:46

## 2024-07-01 RX ADMIN — PREDNISONE 40 MG: 20 TABLET ORAL at 10:24

## 2024-07-01 RX ADMIN — Medication 2 PUFF: at 12:00

## 2024-07-01 RX ADMIN — LEVOTHYROXINE SODIUM 50 MCG: 0.05 TABLET ORAL at 06:01

## 2024-07-01 RX ADMIN — ATORVASTATIN CALCIUM 40 MG: 40 TABLET, FILM COATED ORAL at 21:00

## 2024-07-01 RX ADMIN — CEFEPIME 2000 MG: 2 INJECTION, POWDER, FOR SOLUTION INTRAVENOUS at 20:39

## 2024-07-01 RX ADMIN — IPRATROPIUM BROMIDE AND ALBUTEROL SULFATE 1 DOSE: 2.5; .5 SOLUTION RESPIRATORY (INHALATION) at 12:00

## 2024-07-01 RX ADMIN — IPRATROPIUM BROMIDE AND ALBUTEROL SULFATE 1 DOSE: 2.5; .5 SOLUTION RESPIRATORY (INHALATION) at 20:19

## 2024-07-01 RX ADMIN — AZITHROMYCIN MONOHYDRATE 500 MG: 500 INJECTION, POWDER, LYOPHILIZED, FOR SOLUTION INTRAVENOUS at 10:27

## 2024-07-01 RX ADMIN — CEFEPIME 2000 MG: 2 INJECTION, POWDER, FOR SOLUTION INTRAVENOUS at 12:25

## 2024-07-01 RX ADMIN — ASPIRIN 81 MG: 81 TABLET, COATED ORAL at 10:25

## 2024-07-01 NOTE — CARE COORDINATION
Case Management Assessment  Initial Evaluation    Date/Time of Evaluation: 7/1/2024 11:13 AM  Assessment Completed by: Zoila Thomas RN    If patient is discharged prior to next notation, then this note serves as note for discharge by case management.    Patient Name: Clyde Salgado                   YOB: 1955  Diagnosis: Hypoxia [R09.02]  Sepsis (HCC) [A41.9]  Pneumonia of both lungs due to infectious organism, unspecified part of lung [J18.9]  Chest pain, unspecified type [R07.9]  Exacerbation of asthma, unspecified asthma severity, unspecified whether persistent [J45.901]                   Date / Time: 6/30/2024  4:19 AM    Patient Admission Status: Inpatient   Readmission Risk (Low < 19, Mod (19-27), High > 27): Readmission Risk Score: 12.3    Current PCP: Marnie Pablo MD  PCP verified by CM? Yes    Chart Reviewed: Yes      History Provided by: Patient  Patient Orientation: Alert and Oriented    Patient Cognition: Alert    Hospitalization in the last 30 days (Readmission):  Yes    If yes, Readmission Assessment in  Navigator will be completed.    Advance Directives:      Code Status: Full Code   Patient's Primary Decision Maker is: Legal Next of Kin    Primary Decision Maker: Tawnya Salgado - Spouse - [phone]    Secondary Decision Maker: Hussein Salgado - Child - [phone]    Discharge Planning:    Patient lives with: Spouse/Significant Other Type of Home: Trailer/Mobile Home  Primary Care Giver: Self  Patient Support Systems include: Spouse/Significant Other   Current Financial resources: Medicare  Current community resources: None  Current services prior to admission: None            Current DME:              Type of Home Care services:  None    ADLS  Prior functional level: Independent in ADLs/IADLs  Current functional level: Independent in ADLs/IADLs    PT AM-PAC: 20 /24  OT AM-PAC: 19 /24    Family can provide assistance at DC: Yes  Would you like Case Management to discuss the

## 2024-07-01 NOTE — PLAN OF CARE
Problem: Discharge Planning  Goal: Discharge to home or other facility with appropriate resources  7/1/2024 1615 by Meg Mackay, RN  Outcome: Progressing  7/1/2024 0356 by Bette Lombardi, RN  Outcome: Progressing

## 2024-07-01 NOTE — CARE COORDINATION
07/01/24 1102   Readmission Assessment   Number of Days since last admission? 8-30 days   Previous Disposition Home with Family   Who is being Interviewed Patient   What was the patient's/caregiver's perception as to why they think they needed to return back to the hospital? Other (Comment)  (chest pain)   Did you visit your Primary Care Physician after you left the hospital, before you returned this time? Yes   Did you see a specialist, such as Cardiac, Pulmonary, Orthopedic Physician, etc. after you left the hospital? No   Who advised the patient to return to the hospital? Self-referral   Does the patient report anything that got in the way of taking their medications? No   In our efforts to provide the best possible care to you and others like you, can you think of anything that we could have done to help you after you left the hospital the first time, so that you might not have needed to return so soon? Other (Comment)  (Nothing)     Zoila BOJORQUEZ RN  Case Management  [phone]    Electronically signed by Zoila Thomas RN on 7/1/2024 at 11:09 AM'

## 2024-07-02 VITALS
TEMPERATURE: 98.6 F | RESPIRATION RATE: 27 BRPM | OXYGEN SATURATION: 95 % | SYSTOLIC BLOOD PRESSURE: 135 MMHG | BODY MASS INDEX: 30.85 KG/M2 | HEART RATE: 74 BPM | HEIGHT: 65 IN | WEIGHT: 185.19 LBS | DIASTOLIC BLOOD PRESSURE: 75 MMHG

## 2024-07-02 LAB
GLUCOSE BLD-MCNC: 119 MG/DL (ref 70–99)
GLUCOSE BLD-MCNC: 160 MG/DL (ref 70–99)
PERFORMED ON: ABNORMAL
PERFORMED ON: ABNORMAL
PROCALCITONIN SERPL IA-MCNC: 0.23 NG/ML (ref 0–0.15)

## 2024-07-02 PROCEDURE — 6360000002 HC RX W HCPCS: Performed by: HOSPITALIST

## 2024-07-02 PROCEDURE — 6370000000 HC RX 637 (ALT 250 FOR IP): Performed by: INTERNAL MEDICINE

## 2024-07-02 PROCEDURE — 97535 SELF CARE MNGMENT TRAINING: CPT

## 2024-07-02 PROCEDURE — 94640 AIRWAY INHALATION TREATMENT: CPT

## 2024-07-02 PROCEDURE — 97530 THERAPEUTIC ACTIVITIES: CPT

## 2024-07-02 PROCEDURE — 94760 N-INVAS EAR/PLS OXIMETRY 1: CPT

## 2024-07-02 PROCEDURE — 2580000003 HC RX 258: Performed by: HOSPITALIST

## 2024-07-02 PROCEDURE — 99232 SBSQ HOSP IP/OBS MODERATE 35: CPT | Performed by: INTERNAL MEDICINE

## 2024-07-02 PROCEDURE — 84145 PROCALCITONIN (PCT): CPT

## 2024-07-02 PROCEDURE — 36415 COLL VENOUS BLD VENIPUNCTURE: CPT

## 2024-07-02 PROCEDURE — 6370000000 HC RX 637 (ALT 250 FOR IP): Performed by: HOSPITALIST

## 2024-07-02 RX ORDER — AZITHROMYCIN 500 MG/1
500 TABLET, FILM COATED ORAL DAILY
Status: DISCONTINUED | OUTPATIENT
Start: 2024-07-03 | End: 2024-07-02 | Stop reason: HOSPADM

## 2024-07-02 RX ORDER — LEVOFLOXACIN 750 MG/1
750 TABLET, FILM COATED ORAL DAILY
Qty: 3 TABLET | Refills: 0 | Status: SHIPPED | OUTPATIENT
Start: 2024-07-02 | End: 2024-07-05

## 2024-07-02 RX ORDER — PREDNISONE 20 MG/1
40 TABLET ORAL DAILY
Qty: 10 TABLET | Refills: 0 | Status: SHIPPED | OUTPATIENT
Start: 2024-07-03 | End: 2024-07-08

## 2024-07-02 RX ORDER — BUDESONIDE AND FORMOTEROL FUMARATE DIHYDRATE 160; 4.5 UG/1; UG/1
2 AEROSOL RESPIRATORY (INHALATION) 2 TIMES DAILY
Qty: 2 EACH | Refills: 0 | Status: SHIPPED | OUTPATIENT
Start: 2024-07-02 | End: 2024-08-01

## 2024-07-02 RX ADMIN — GUAIFENESIN AND CODEINE PHOSPHATE 5 ML: 100; 10 SOLUTION ORAL at 09:50

## 2024-07-02 RX ADMIN — PREDNISONE 40 MG: 20 TABLET ORAL at 08:43

## 2024-07-02 RX ADMIN — CEFEPIME 2000 MG: 2 INJECTION, POWDER, FOR SOLUTION INTRAVENOUS at 05:56

## 2024-07-02 RX ADMIN — Medication 2 PUFF: at 07:47

## 2024-07-02 RX ADMIN — ASPIRIN 81 MG: 81 TABLET, COATED ORAL at 08:43

## 2024-07-02 RX ADMIN — LEVOTHYROXINE SODIUM 50 MCG: 0.05 TABLET ORAL at 05:54

## 2024-07-02 RX ADMIN — IPRATROPIUM BROMIDE AND ALBUTEROL SULFATE 1 DOSE: 2.5; .5 SOLUTION RESPIRATORY (INHALATION) at 11:52

## 2024-07-02 RX ADMIN — GUAIFENESIN AND CODEINE PHOSPHATE 5 ML: 100; 10 SOLUTION ORAL at 05:54

## 2024-07-02 RX ADMIN — AZITHROMYCIN MONOHYDRATE 500 MG: 500 INJECTION, POWDER, LYOPHILIZED, FOR SOLUTION INTRAVENOUS at 09:49

## 2024-07-02 RX ADMIN — PANTOPRAZOLE SODIUM 40 MG: 40 TABLET, DELAYED RELEASE ORAL at 05:54

## 2024-07-02 RX ADMIN — IPRATROPIUM BROMIDE AND ALBUTEROL SULFATE 1 DOSE: 2.5; .5 SOLUTION RESPIRATORY (INHALATION) at 07:47

## 2024-07-02 RX ADMIN — GUAIFENESIN AND CODEINE PHOSPHATE 5 ML: 100; 10 SOLUTION ORAL at 14:25

## 2024-07-02 NOTE — DISCHARGE SUMMARY
V2.0  Discharge Summary    Name:  Clyde Salgado /Age/Sex: 1955 (69 y.o. male)   Admit Date: 2024  Discharge Date: 24    MRN & CSN:  [id number] & [id number] Encounter Date and Time 24 11:30 AM EDT    Attending:  Femi Newton MD Discharging Provider: Femi Newton MD       Hospital Course:     Patient is a 69-year-old male past medical history of asthma, previous histoplasmosis, who was admitted for acute hypoxic and hypercapnic respiratory failure due to asthma exacerbation.  Patient was seen by pulmonary.  He was treated with steroids, bronchodilators and antibiotics for elevated procalcitonin concerning for pneumonia.  Patient was successfully weaned off to room air and was switched to oral antibiotics and steroid upon DC.  He was cleared by pulmonary for discharge.  Since patient has benefited the most from hospitalization, he will be discharged back home in stable condition.  He was advised to follow-up with his PCP for transition of care.    Discharge Instruction:     Primary care physician: Marnie Pablo MD within 2 weeks  Diet: regular diet   Activity: activity as tolerated  Disposition: Discharged to:   [x]Home, [][facility], []SNF, []Acute Rehab, []Hospice   Condition on discharge: Stable    Discharge Medications:        Medication List        START taking these medications      budesonide-formoterol 160-4.5 MCG/ACT Aero  Commonly known as: SYMBICORT  Inhale 2 puffs into the lungs 2 times daily     levoFLOXacin 750 MG tablet  Commonly known as: LEVAQUIN  Take 1 tablet by mouth daily for 3 days     predniSONE 20 MG tablet  Commonly known as: DELTASONE  Take 2 tablets by mouth daily for 5 days  Start taking on: July 3, 2024            CHANGE how you take these medications      atorvastatin 40 MG tablet  Commonly known as: LIPITOR  Take 1 tablet by mouth nightly  What changed: when to take this            CONTINUE taking these medications      aspirin 81 MG EC tablet  Take 1 tablet by mouth 
Retired

## 2024-07-02 NOTE — PLAN OF CARE
Problem: Pain  Goal: Verbalizes/displays adequate comfort level or baseline comfort level  7/2/2024 0125 by Bette Lombardi RN  Outcome: Progressing

## 2024-07-02 NOTE — PLAN OF CARE
Problem: Discharge Planning  Goal: Discharge to home or other facility with appropriate resources  7/2/2024 0125 by Bette Lombardi RN  Outcome: Progressing  7/1/2024 1615 by Meg Mackay RN  Outcome: Progressing     Problem: Pain  Goal: Verbalizes/displays adequate comfort level or baseline comfort level  7/2/2024 0125 by Bette Lombardi RN  Outcome: Progressing  7/1/2024 1615 by Meg Mackay RN  Outcome: Progressing     Problem: ABCDS Injury Assessment  Goal: Absence of physical injury  Outcome: Progressing

## 2024-07-02 NOTE — CARE COORDINATION
CASE MANAGEMENT DISCHARGE SUMMARY: Discharge order noted. Patient returning to home w/spouse. No needs identified.     DISCHARGE DATE: 7/2/24    DISCHARGED TO HOME     TRANSPORTATION: Spouse             TIME: Afternoon     PREFERRED PHARMACY: [facility]       Zoila BOJORQUEZ, RN  Case Management  [phone]             Electronically signed by Zoila Thomas RN on 7/2/2024 at 3:06 PM

## 2024-07-03 NOTE — PROGRESS NOTES
Hospitalist Progress Note  7/1/2024 9:29 AM    PCP: Marnie Pablo MD    [id number]     Date of Admission: 6/30/2024                                                                                                                     HOSPITAL COURSE    Patient demographics:  The patient  Clyde Salgado is a 69 y.o. male     Significant past medical history:   Patient Active Problem List   Diagnosis    Paresthesia of right arm and leg    SOB (shortness of breath)    Bronchospasm    Pulmonary nodules    Uncomplicated asthma    Pneumonia due to infectious agent    Sepsis ([facility])    Tachycardia    Tachypnea    Neutrophilic leukocytosis    Acquired hypothyroidism    Hyperlipidemia    DMII (diabetes mellitus, type 2) ([facility])         Presenting symptoms:      Diagnostic workup:      CONSULTS DURING ADMISSION :   IP CONSULT TO PULMONOLOGY      Patient was diagnosed with:        Treatment while inpatient:                                                                                         ----------------------------------------------------------      SUBJECTIVE COMPLAINTS-     Diet: ADULT DIET; Regular      OBJECTIVE:   Patient Active Problem List   Diagnosis    Paresthesia of right arm and leg    SOB (shortness of breath)    Bronchospasm    Pulmonary nodules    Uncomplicated asthma    Pneumonia due to infectious agent    Sepsis ([facility])    Tachycardia    Tachypnea    Neutrophilic leukocytosis    Acquired hypothyroidism    Hyperlipidemia    DMII (diabetes mellitus, type 2) ([facility])       Allergies  Patient has no known allergies.    Medications    Scheduled Meds:   sodium chloride flush  5-40 mL IntraVENous 2 times per day    azithromycin  500 mg IntraVENous Daily    cefepime  2,000 mg IntraVENous Q8H    aspirin  81 mg Oral Daily    atorvastatin  40 mg Oral Nightly    levothyroxine  50 mcg Oral QAM AC    pantoprazole  40 mg Oral QAM AC    sodium chloride flush  5-40 mL IntraVENous 2 times per day    enoxaparin  40 mg 
4 Eyes Skin Assessment     NAME:  Clyde Salgado  YOB: 1955  MEDICAL RECORD NUMBER:  [id number]    The patient is being assessed for  Admission    I agree that at least one RN has performed a thorough Head to Toe Skin Assessment on the patient. ALL assessment sites listed below have been assessed.      Areas assessed by both nurses:    Head, Face, Ears, Shoulders, Back, Chest, Arms, Elbows, Hands, Sacrum. Buttock, Coccyx, Ischium, and Legs. Feet and Heels        Does the Patient have a Wound? No noted wound(s)     Abrasion on R shin  Christ Prevention initiated by RN: No  Wound Care Orders initiated by RN: No    Pressure Injury (Stage 3,4, Unstageable, DTI, NWPT, and Complex wounds) if present, place Wound referral order by RN under : No    New Ostomies, if present place, Ostomy referral order under : No     Nurse 1 eSignature: Electronically signed by Latricia Young RN on 6/30/24 at 5:52 PM EDT    **SHARE this note so that the co-signing nurse can place an eSignature**    Nurse 2 eSignature: Electronically signed by Marli Angeles RN on 6/30/24 at 5:57 PM EDT   
Occupational Therapy  Facility/Department: [address] PROGRESSIVE CARE  Occupational Therapy Daily Treatment and Discharge Summary    Name: Clyde Salgado  : 1955  MRN: [id number]  Date of Service: 2024    Discharge Recommendations:  Home with assist PRN  OT Equipment Recommendations  Equipment Needed: No       Patient Diagnosis(es): The primary encounter diagnosis was Chest pain, unspecified type. Diagnoses of Pneumonia of both lungs due to infectious organism, unspecified part of lung, Exacerbation of asthma, unspecified asthma severity, unspecified whether persistent, and Hypoxia were also pertinent to this visit.  Past Medical History:  has a past medical history of Acquired hypothyroidism, DMII (diabetes mellitus, type 2) ([facility]), GERD (gastroesophageal reflux disease), Hyperlipidemia, Movement disorder, Pneumonia due to infectious agent, and Uncomplicated asthma.  Past Surgical History:  has a past surgical history that includes Rotator cuff repair.      Assessment   Performance deficits / Impairments: Decreased functional mobility ;Decreased endurance;Decreased strength;Decreased safe awareness;Decreased ADL status;Decreased fine motor control;Decreased balance;Decreased high-level IADLs  Assessment: 70 y/o male admitted 2024 with chest pain, PNA, Exacerbation of asthma, and hypoxia. PTA pt lives at home wth spouse and was independent with ADLs and functional mobility. Today pt IND for mobility/transfers and ADLs while seated/standing at couch. No skilled OT servcies recommended at this time.  Prognosis: Good  REQUIRES OT FOLLOW-UP: No  Activity Tolerance  Activity Tolerance: Patient Tolerated treatment well  Activity Tolerance Comments: on RA, above 90% throughout        Plan   Occupational Therapy Plan  Times Per Week: 3-5  Current Treatment Recommendations: Strengthening, Balance training, Functional mobility training, Endurance training, Patient/Caregiver education & training, Self-Care / 
Physical Therapy  Facility/Department: [address] PROGRESSIVE CARE  Physical Therapy Treatment Session   Name: Clyde Salgado  : 1955  MRN: [id number]  Date of Service: 2024    Discharge Recommendations:  Home with assist PRN   PT Equipment Recommendations  Equipment Needed: No    Clyde Salgado scored a 24/24 on the AM-PAC short mobility form.  At this time, no further PT is recommended upon discharge due to independent with mobility.  Recommend patient returns to prior setting with prior services.      This note also serves as a D/C Summary       Patient Diagnosis(es): The primary encounter diagnosis was Chest pain, unspecified type. Diagnoses of Pneumonia of both lungs due to infectious organism, unspecified part of lung, Exacerbation of asthma, unspecified asthma severity, unspecified whether persistent, and Hypoxia were also pertinent to this visit.  Past Medical History:  has a past medical history of Acquired hypothyroidism, DMII (diabetes mellitus, type 2) ([facility]), GERD (gastroesophageal reflux disease), Hyperlipidemia, Movement disorder, Pneumonia due to infectious agent, and Uncomplicated asthma.  Past Surgical History:  has a past surgical history that includes Rotator cuff repair.    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 This date patient appears to be at his baseline function.  pt will be safe to return home at discharge without further therapy. Will sign off this date. No further PT needs  Therapy Prognosis: Good  Decision Making: Low Complexity  Requires PT Follow-Up: No  Activity Tolerance  Activity Tolerance: Patient tolerated treatment well;Patient tolerated evaluation without incident     Plan   Physical Therapy 
Physical Therapy  Facility/Department: [address] PROGRESSIVE CARE  Physical Therapy Initial Assessment    Name: Clyde Salgado  : 1955  MRN: [id number]  Date of Service: 2024    Discharge Recommendations:  Home with assist PRN   PT Equipment Recommendations  Equipment Needed: No      Clyde Salgado scored a 20/24 on the AM-PAC short mobility form. Current research shows that an AM-PAC score of 18 or greater is typically associated with a discharge to the patient's home setting. At this time anticipate pt will be safe to return home without any further therapy at discharge.  Please see assessment section for further patient specific details.    If patient discharges prior to next session this note will serve as a discharge summary.  Please see below for the latest assessment towards goals.       Patient Diagnosis(es): The primary encounter diagnosis was Chest pain, unspecified type. Diagnoses of Pneumonia of both lungs due to infectious organism, unspecified part of lung, Exacerbation of asthma, unspecified asthma severity, unspecified whether persistent, and Hypoxia were also pertinent to this visit.  Past Medical History:  has a past medical history of Acquired hypothyroidism, DMII (diabetes mellitus, type 2) ([facility]), GERD (gastroesophageal reflux disease), Hyperlipidemia, Movement disorder, Pneumonia due to infectious agent, and Uncomplicated asthma.  Past Surgical History:  has a past surgical history that includes Rotator cuff repair.    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  He currently is slightly below his baseline for mobility tasks due to decreased endurance and now on 3 liters O2 via NC.  pt will be safe to return home at 
Pt arrived to room 5104 from ED via stretcher. Telemetry activated, CMU verified. Pt A&O x4, VSS. Assessment completed and history obtained. Pt oriented to room and unit. Orders reviewed, medications verified, all questions answered. Pt resting comfortably in bed, voices no complaints at this time. Bed in lowest position with wheels locked and bed alarm on. Call light within reach.   
Pt discharged at this time. Reviewed all discharge instructions and follow up appts with patient. Patient states that he/she understands. Ivs removed at this time without complications. Pt does not voice any questions or concerns at this time. Belongings in hand. Medications received from Retail pharmacy at time of discharge. Patient assisted to vehicle at this time. Patient tolerated well.    
Pt resting comfortably in bed with 2 rails up. Denies any request at this time. All questions answered. Call light and bedside table in reach, along with all personal items.      Pt would like to monik and sleep without BiPAP tonight. . Pt requesting cough medications.     
Pulmonary Progress Note    CC:  Follow up asthma    Subjective:  Used bilevel overnight  2-6 liters during the day   He says he is feeling better  He is less SOB and having less chest pains.  He is still SOB with activity  Wife is present    ROS  Still SOB but slightly better  Less chest pains      Intake/Output Summary (Last 24 hours) at 7/1/2024 0757  Last data filed at 6/30/2024 1844  Gross per 24 hour   Intake 360 ml   Output 250 ml   Net 110 ml         PHYSICAL EXAM:  Blood pressure 132/81, pulse 79, temperature 98.5 °F (36.9 °C), temperature source Oral, resp. rate 22, height 1.651 m (5' 5\"), weight 82.7 kg (182 lb 5.1 oz), SpO2 95 %.'  Gen: No distress. Flat  Eyes: PERRL. No sclera icterus. No conjunctival injection.   ENT: No discharge. Pharynx clear. External appearance of ears and nose normal.  Neck: Trachea midline. No obvious mass.    Resp: Essentially clear  CV: Regular rate. Regular rhythm. No murmur or rub.    GI: Non-tender. Non-distended. No hernia.   Skin: Warm, dry, normal texture and turgor. No nodule on exposed extremities.   Lymph: No cervical LAD. No supraclavicular LAD.   M/S: No cyanosis. No clubbing. No joint deformity.    Neuro: Moves all four extremities. CN 2-12 tested, no defect noted.  Ext:   no edema    Medications:    Scheduled Meds:   sodium chloride flush  5-40 mL IntraVENous 2 times per day    azithromycin  500 mg IntraVENous Daily    cefepime  2,000 mg IntraVENous Q8H    aspirin  81 mg Oral Daily    atorvastatin  40 mg Oral Nightly    levothyroxine  50 mcg Oral QAM AC    pantoprazole  40 mg Oral QAM AC    sodium chloride flush  5-40 mL IntraVENous 2 times per day    enoxaparin  40 mg SubCUTAneous Nightly    insulin lispro  0-8 Units SubCUTAneous TID WC    insulin lispro  0-4 Units SubCUTAneous Nightly    predniSONE  40 mg Oral Daily    ALPRAZolam  0.5 mg Oral Once       Continuous Infusions:   sodium chloride      sodium chloride 100 mL/hr at 06/30/24 5956    sodium chloride      
Not applicable / Not valid  -- Disagree - Clinically unable to determine / Unknown  -- Refer to Clinical Documentation Reviewer    PROVIDER RESPONSE TEXT:    The diagnosis of Sepsis due to pneumonia confirmed present on admission .    Query created by: Zully Diggs on 7/2/2024 2:41 PM      Electronically signed by:  Femi Daniel MD 7/3/2024 2:50 PM          
Tolerance: Patient tolerated treatment well  Bed mobility  Supine to Sit: Modified independent  Sit to Supine:  (pt in chair at end of session)    Transfers  Sit to stand: Stand by assistance  Stand to sit: Stand by assistance    Vision  Vision: Within Functional Limits  Hearing  Hearing: Within functional limits  Cognition  Overall Cognitive Status: WFL  Orientation  Overall Orientation Status: Within Functional Limits       Education Given To: Patient;Family  Education Provided: Role of Therapy;Plan of Care;Transfer Training  Education Method: Verbal;Demonstration  Barriers to Learning: None  Education Outcome: Demonstrated understanding;Verbalized understanding    LUE AROM (degrees)  LUE AROM : WFL  Left Hand AROM (degrees)  Left Hand AROM: WFL  RUE AROM (degrees)  RUE AROM : WFL  Right Hand AROM (degrees)  Right Hand AROM: WFL    AM-PAC - ADL  AM-PAC Daily Activity - Inpatient   How much help is needed for putting on and taking off regular lower body clothing?: A Little  How much help is needed for bathing (which includes washing, rinsing, drying)?: A Little  How much help is needed for toileting (which includes using toilet, bedpan, or urinal)?: A Little  How much help is needed for putting on and taking off regular upper body clothing?: A Little  How much help is needed for taking care of personal grooming?: A Little  How much help for eating meals?: None  AM-PAC Inpatient Daily Activity Raw Score: 19  AM-PAC Inpatient ADL T-Scale Score : 40.22  ADL Inpatient CMS 0-100% Score: 42.8  ADL Inpatient CMS G-Code Modifier : CK    Goals  Short Term Goals  Time Frame for Short Term Goals: Prior to DC:  Short Term Goal 1: Pt will complete ADL transfer/mobility with supervision  Short Term Goal 2: Pt will tolerate standing > 5 min for functional task with supervision  Short Term Goal 3: Pt will complete LB Dressing with supervision  Short Term Goal 4: Pt will complete toileting with supervision  Patient Goals   Patient 
today.  If negative can DC antibiotics     DVT prophylaxis  Lovenox     Hopefully home today  I will arrange follow up     Edis Schulz, DO [facility]

## 2024-07-04 LAB
BACTERIA BLD CULT ORG #2: NORMAL
BACTERIA BLD CULT: NORMAL

## 2024-07-05 ENCOUNTER — TELEPHONE (OUTPATIENT)
Dept: PULMONOLOGY | Age: 69
End: 2024-07-05

## 2024-07-05 NOTE — TELEPHONE ENCOUNTER
Nasir Schulz DO [facility] Pulmonology Practice Staff  Needs follow up with Dr Ardon.  He last saw him in 2021.  Possible asthma.  Probably 1-3 weeks.  Thanks      LMOM to call and make appt

## 2024-07-07 LAB
A ALTERNATA IGE QN: <0.1 KU/L (ref 0–0.34)
A FUMIGATUS IGE QN: <0.1 KU/L (ref 0–0.34)
AMER SYCAMORE IGE QN: <0.1 KU/L (ref 0–0.34)
BERMUDA GRASS IGE QN: <0.1 KU/L (ref 0–0.34)
BOXELDER IGE QN: <0.1 KU/L (ref 0–0.34)
C SPHAEROSPERMUM IGE QN: <0.1 KUL/L (ref 0–0.34)
CALIF WALNUT IGE QN: <0.1 KU/L (ref 0–0.34)
CAT DANDER IGE QN: <0.1 KU/L (ref 0–0.34)
CMN PIGWEED IGE QN: <0.1 KU/L (ref 0–0.34)
COMMON RAGWEED IGE QN: <0.1 KU/L (ref 0–0.34)
COTTONWOOD IGE QN: <0.1 KU/L (ref 0–0.34)
D FARINAE IGE QN: <0.1 KU/L (ref 0–0.34)
D PTERONYSS IGE QN: <0.1 KU/L (ref 0–0.34)
DOG DANDER IGE QN: <0.1 KU/L (ref 0–0.34)
IGE SERPL-ACNC: 38 IU/ML (ref 0–100)
M RACEMOSUS IGE QN: <0.1 KU/L (ref 0–0.34)
MOUSE EPITH IGE QN: <0.1 KU/L (ref 0–0.34)
P NOTATUM IGE QN: <0.1 KU/L (ref 0–0.34)
PECAN/HICK TREE IGE QN: <0.1 KU/L (ref 0–0.34)
RED CEDAR IGE QN: <0.1 KU/L (ref 0–0.34)
ROACH IGE QN: <0.1 KU/L (ref 0–0.34)
SALTWORT IGE QN: <0.1 KU/L (ref 0–0.34)
SHEEP SORREL IGE QN: <0.1 KU/L (ref 0–0.34)
SILVER BIRCH IGE QN: <0.1 KU/L (ref 0–0.34)
TIMOTHY IGE QN: <0.1 KU/L (ref 0–0.34)
WHITE ASH IGE QN: <0.1 KU/L (ref 0–0.34)
WHITE ELM IGE QN: <0.1 KU/L (ref 0–0.34)
WHITE MULBERRY IGE QN: <0.1 KU/L (ref 0–0.34)
WHITE OAK IGE QN: <0.1 KU/L (ref 0–0.34)

## 2024-07-18 ENCOUNTER — OFFICE VISIT (OUTPATIENT)
Dept: PULMONOLOGY | Age: 69
End: 2024-07-18
Payer: MEDICARE

## 2024-07-18 VITALS
HEIGHT: 65 IN | WEIGHT: 183.4 LBS | HEART RATE: 85 BPM | TEMPERATURE: 97.7 F | BODY MASS INDEX: 30.56 KG/M2 | DIASTOLIC BLOOD PRESSURE: 80 MMHG | SYSTOLIC BLOOD PRESSURE: 120 MMHG | RESPIRATION RATE: 16 BRPM | OXYGEN SATURATION: 97 %

## 2024-07-18 DIAGNOSIS — R91.8 PULMONARY NODULES: Primary | ICD-10-CM

## 2024-07-18 DIAGNOSIS — J45.41 MODERATE PERSISTENT ASTHMA WITH EXACERBATION: ICD-10-CM

## 2024-07-18 PROBLEM — R06.02 SOB (SHORTNESS OF BREATH): Status: RESOLVED | Noted: 2021-05-23 | Resolved: 2024-07-18

## 2024-07-18 PROBLEM — J45.909 UNCOMPLICATED ASTHMA: Status: RESOLVED | Noted: 2021-06-16 | Resolved: 2024-07-18

## 2024-07-18 PROCEDURE — 1111F DSCHRG MED/CURRENT MED MERGE: CPT | Performed by: INTERNAL MEDICINE

## 2024-07-18 PROCEDURE — 3017F COLORECTAL CA SCREEN DOC REV: CPT | Performed by: INTERNAL MEDICINE

## 2024-07-18 PROCEDURE — 1036F TOBACCO NON-USER: CPT | Performed by: INTERNAL MEDICINE

## 2024-07-18 PROCEDURE — G8427 DOCREV CUR MEDS BY ELIG CLIN: HCPCS | Performed by: INTERNAL MEDICINE

## 2024-07-18 PROCEDURE — 1123F ACP DISCUSS/DSCN MKR DOCD: CPT | Performed by: INTERNAL MEDICINE

## 2024-07-18 PROCEDURE — 99214 OFFICE O/P EST MOD 30 MIN: CPT | Performed by: INTERNAL MEDICINE

## 2024-07-18 PROCEDURE — G8417 CALC BMI ABV UP PARAM F/U: HCPCS | Performed by: INTERNAL MEDICINE

## 2024-07-18 ASSESSMENT — ENCOUNTER SYMPTOMS: RESPIRATORY NEGATIVE: 1

## 2024-07-18 NOTE — PROGRESS NOTES
[facility] PULMONARY, SLEEP, AND CRITICAL CARE   Clyde Salgado (:  1955) is a 69 y.o. male,Established patient, here for evaluation of the following chief complaint(s):  Follow-Up from Hospital and Pneumonia      Assessment & Plan   ASSESSMENT/PLAN:  1. Pulmonary nodules  Assessment & Plan:  -Has a history of likely sequela of granulomatous disease, no suspicious nodules but newly present will need surveillance CT scan with return to clinic  Orders:  -     CT CHEST WO CONTRAST; Future  2. Moderate persistent asthma with exacerbation  Assessment & Plan:  -Recent admission for chest pressure  -Symbicort twice daily, says his PCP prescribed albuterol PRN he has not picked this up yet but will today        Return in about 4 months (around 2024).         Subjective   SUBJECTIVE/OBJECTIVE:  Patient was to follow-up with not been seen in greater than 3 years.    History of asthma, recent admission for chest pain    Negative respiratory allergy profile, eos of 500    Pneumonia        Review of Systems   Constitutional: Negative.    HENT: Negative.     Respiratory: Negative.     Cardiovascular: Negative.    Musculoskeletal: Negative.    Neurological: Negative.    Psychiatric/Behavioral: Negative.            Objective   Physical Exam     Gen:  No acute distress.   Eyes: PERRL. EOMI. Anicteric sclera. No conjunctival injection.   Resp:  No crackles. No wheezes. No rhonchi. No dullness on percussion.  CV: Regular rate. Regular rhythm. No murmur or rub. No edema.   Neuro:  no focal neurologic deficit.  Moves all extremities  Psych: Awake and alert, Oriented x 3.  Judgement and insight appropriate.  Mood stable.    PFT  Summary:   Consistent with mixed obstructive and restrictive defects.  Given   significant bronchodilator response with preserved DLCO would   consider component of asthma or some type of reactive airways   disease with concomitant restrictive process.     CT images reviewed personally 
HLD (hyperlipidemia)

## 2024-07-18 NOTE — ASSESSMENT & PLAN NOTE
-Recent admission for chest pressure  -Symbicort twice daily, says his PCP prescribed albuterol PRN he has not picked this up yet but will today

## 2024-07-18 NOTE — ASSESSMENT & PLAN NOTE
-Has a history of likely sequela of granulomatous disease, no suspicious nodules but newly present will need surveillance CT scan with return to clinic

## 2025-05-04 ENCOUNTER — APPOINTMENT (OUTPATIENT)
Dept: GENERAL RADIOLOGY | Age: 70
End: 2025-05-04
Payer: OTHER MISCELLANEOUS

## 2025-05-04 ENCOUNTER — HOSPITAL ENCOUNTER (EMERGENCY)
Age: 70
Discharge: HOME | End: 2025-05-04
Payer: OTHER MISCELLANEOUS

## 2025-05-04 VITALS
SYSTOLIC BLOOD PRESSURE: 124 MMHG | OXYGEN SATURATION: 97 % | BODY MASS INDEX: 29.52 KG/M2 | HEART RATE: 75 BPM | HEIGHT: 67 IN | TEMPERATURE: 98.2 F | RESPIRATION RATE: 18 BRPM | WEIGHT: 188.05 LBS | DIASTOLIC BLOOD PRESSURE: 78 MMHG

## 2025-05-04 DIAGNOSIS — S16.1XXA CERVICAL STRAIN, ACUTE, INITIAL ENCOUNTER: ICD-10-CM

## 2025-05-04 DIAGNOSIS — S70.02XA CONTUSION OF LEFT HIP, INITIAL ENCOUNTER: ICD-10-CM
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
## 2025-05-04 PROCEDURE — 73502 X-RAY EXAM HIP UNI 2-3 VIEWS: CPT

## 2025-05-04 PROCEDURE — 72040 X-RAY EXAM NECK SPINE 2-3 VW: CPT

## 2025-05-04 PROCEDURE — 99283 EMERGENCY DEPT VISIT LOW MDM: CPT

## 2025-05-04 RX ORDER — LIDOCAINE 4 G/G
PATCH TOPICAL
Qty: 10 PATCH | Refills: 0 | Status: SHIPPED | OUTPATIENT
Start: 2025-05-04

## 2025-05-04 ASSESSMENT — PAIN SCALES - GENERAL: PAINLEVEL_OUTOF10: 9

## 2025-05-04 ASSESSMENT — PAIN DESCRIPTION - PAIN TYPE: TYPE: ACUTE PAIN

## 2025-05-04 ASSESSMENT — PAIN - FUNCTIONAL ASSESSMENT: PAIN_FUNCTIONAL_ASSESSMENT: PREVENTS OR INTERFERES SOME ACTIVE ACTIVITIES AND ADLS

## 2025-05-04 ASSESSMENT — PAIN DESCRIPTION - ORIENTATION: ORIENTATION: LEFT

## 2025-05-04 ASSESSMENT — PAIN DESCRIPTION - DESCRIPTORS: DESCRIPTORS: ACHING;SHOOTING;SORE

## 2025-05-04 ASSESSMENT — PAIN DESCRIPTION - LOCATION: LOCATION: NECK;HIP
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
## 2025-05-04 NOTE — ED NOTES
